# Patient Record
Sex: FEMALE | Race: WHITE | Employment: OTHER | URBAN - METROPOLITAN AREA
[De-identification: names, ages, dates, MRNs, and addresses within clinical notes are randomized per-mention and may not be internally consistent; named-entity substitution may affect disease eponyms.]

---

## 2022-08-22 ENCOUNTER — HOSPITAL ENCOUNTER (INPATIENT)
Facility: HOSPITAL | Age: 72
LOS: 5 days | Discharge: HOME | DRG: 871 | End: 2022-08-28
Attending: FAMILY MEDICINE | Admitting: FAMILY MEDICINE
Payer: MEDICARE

## 2022-08-22 DIAGNOSIS — R50.9 FEVER, UNSPECIFIED FEVER CAUSE: ICD-10-CM

## 2022-08-22 DIAGNOSIS — R00.1 BRADYCARDIA: ICD-10-CM

## 2022-08-22 DIAGNOSIS — R41.82 ALTERED MENTAL STATUS: Primary | ICD-10-CM

## 2022-08-22 LAB
ALBUMIN SERPL BCP-MCNC: 4 G/DL (ref 3.5–5.2)
ALP SERPL-CCNC: 49 U/L (ref 55–135)
ALT SERPL W/O P-5'-P-CCNC: 14 U/L (ref 10–44)
ANION GAP SERPL CALC-SCNC: 13 MMOL/L (ref 8–16)
AST SERPL-CCNC: 17 U/L (ref 10–40)
BACTERIA #/AREA URNS HPF: ABNORMAL /HPF
BASOPHILS # BLD AUTO: 0.06 K/UL (ref 0–0.2)
BASOPHILS NFR BLD: 0.5 % (ref 0–1.9)
BILIRUB SERPL-MCNC: 0.8 MG/DL (ref 0.1–1)
BILIRUB UR QL STRIP: NEGATIVE
BNP SERPL-MCNC: 18 PG/ML (ref 0–99)
BUN SERPL-MCNC: 13 MG/DL (ref 8–23)
CALCIUM SERPL-MCNC: 9.1 MG/DL (ref 8.7–10.5)
CHLORIDE SERPL-SCNC: 99 MMOL/L (ref 95–110)
CLARITY UR: CLEAR
CO2 SERPL-SCNC: 21 MMOL/L (ref 23–29)
COLOR UR: YELLOW
CREAT SERPL-MCNC: 0.8 MG/DL (ref 0.5–1.4)
CTP QC/QA: YES
DIFFERENTIAL METHOD: ABNORMAL
EOSINOPHIL # BLD AUTO: 0 K/UL (ref 0–0.5)
EOSINOPHIL NFR BLD: 0 % (ref 0–8)
ERYTHROCYTE [DISTWIDTH] IN BLOOD BY AUTOMATED COUNT: 13.7 % (ref 11.5–14.5)
EST. GFR  (NO RACE VARIABLE): >60 ML/MIN/1.73 M^2
GLUCOSE SERPL-MCNC: 114 MG/DL (ref 70–110)
GLUCOSE UR QL STRIP: NEGATIVE
HCT VFR BLD AUTO: 44.8 % (ref 37–48.5)
HGB BLD-MCNC: 14.7 G/DL (ref 12–16)
HGB UR QL STRIP: NEGATIVE
HYALINE CASTS #/AREA URNS LPF: 0 /LPF
IMM GRANULOCYTES # BLD AUTO: 0.05 K/UL (ref 0–0.04)
IMM GRANULOCYTES NFR BLD AUTO: 0.4 % (ref 0–0.5)
INFLUENZA A, MOLECULAR: NEGATIVE
INFLUENZA B, MOLECULAR: NEGATIVE
KETONES UR QL STRIP: ABNORMAL
LACTATE SERPL-SCNC: 1.8 MMOL/L (ref 0.5–2.2)
LEUKOCYTE ESTERASE UR QL STRIP: ABNORMAL
LYMPHOCYTES # BLD AUTO: 1.9 K/UL (ref 1–4.8)
LYMPHOCYTES NFR BLD: 14.8 % (ref 18–48)
MCH RBC QN AUTO: 29.1 PG (ref 27–31)
MCHC RBC AUTO-ENTMCNC: 32.8 G/DL (ref 32–36)
MCV RBC AUTO: 89 FL (ref 82–98)
MICROSCOPIC COMMENT: ABNORMAL
MONOCYTES # BLD AUTO: 0.7 K/UL (ref 0.3–1)
MONOCYTES NFR BLD: 5.2 % (ref 4–15)
NEUTROPHILS # BLD AUTO: 10.3 K/UL (ref 1.8–7.7)
NEUTROPHILS NFR BLD: 79.1 % (ref 38–73)
NITRITE UR QL STRIP: NEGATIVE
NRBC BLD-RTO: 0 /100 WBC
PH UR STRIP: 6 [PH] (ref 5–8)
PLATELET # BLD AUTO: 222 K/UL (ref 150–450)
PMV BLD AUTO: 9.7 FL (ref 9.2–12.9)
POCT GLUCOSE: 96 MG/DL (ref 70–110)
POTASSIUM SERPL-SCNC: 4.1 MMOL/L (ref 3.5–5.1)
PROCALCITONIN SERPL IA-MCNC: 0.04 NG/ML
PROT SERPL-MCNC: 7.3 G/DL (ref 6–8.4)
PROT UR QL STRIP: ABNORMAL
RBC # BLD AUTO: 5.06 M/UL (ref 4–5.4)
RBC #/AREA URNS HPF: 0 /HPF (ref 0–4)
SARS-COV-2 RDRP RESP QL NAA+PROBE: NEGATIVE
SODIUM SERPL-SCNC: 133 MMOL/L (ref 136–145)
SP GR UR STRIP: 1.02 (ref 1–1.03)
SPECIMEN SOURCE: NORMAL
SQUAMOUS #/AREA URNS HPF: 1 /HPF
TROPONIN I SERPL DL<=0.01 NG/ML-MCNC: <0.006 NG/ML (ref 0–0.03)
URN SPEC COLLECT METH UR: ABNORMAL
UROBILINOGEN UR STRIP-ACNC: NEGATIVE EU/DL
WBC # BLD AUTO: 13 K/UL (ref 3.9–12.7)
WBC #/AREA URNS HPF: 6 /HPF (ref 0–5)

## 2022-08-22 PROCEDURE — 25000003 PHARM REV CODE 250: Performed by: FAMILY MEDICINE

## 2022-08-22 PROCEDURE — 93010 EKG 12-LEAD: ICD-10-PCS | Mod: ,,, | Performed by: INTERNAL MEDICINE

## 2022-08-22 PROCEDURE — 84145 PROCALCITONIN (PCT): CPT | Performed by: FAMILY MEDICINE

## 2022-08-22 PROCEDURE — 99291 CRITICAL CARE FIRST HOUR: CPT | Mod: 25

## 2022-08-22 PROCEDURE — 96361 HYDRATE IV INFUSION ADD-ON: CPT

## 2022-08-22 PROCEDURE — 96360 HYDRATION IV INFUSION INIT: CPT

## 2022-08-22 PROCEDURE — 83605 ASSAY OF LACTIC ACID: CPT | Performed by: FAMILY MEDICINE

## 2022-08-22 PROCEDURE — 84484 ASSAY OF TROPONIN QUANT: CPT | Performed by: FAMILY MEDICINE

## 2022-08-22 PROCEDURE — 85025 COMPLETE CBC W/AUTO DIFF WBC: CPT | Performed by: FAMILY MEDICINE

## 2022-08-22 PROCEDURE — 80053 COMPREHEN METABOLIC PANEL: CPT | Performed by: FAMILY MEDICINE

## 2022-08-22 PROCEDURE — 82962 GLUCOSE BLOOD TEST: CPT

## 2022-08-22 PROCEDURE — 87502 INFLUENZA DNA AMP PROBE: CPT | Performed by: FAMILY MEDICINE

## 2022-08-22 PROCEDURE — 83880 ASSAY OF NATRIURETIC PEPTIDE: CPT | Performed by: FAMILY MEDICINE

## 2022-08-22 PROCEDURE — 63600175 PHARM REV CODE 636 W HCPCS: Performed by: FAMILY MEDICINE

## 2022-08-22 PROCEDURE — U0002 COVID-19 LAB TEST NON-CDC: HCPCS | Performed by: FAMILY MEDICINE

## 2022-08-22 PROCEDURE — 93010 ELECTROCARDIOGRAM REPORT: CPT | Mod: ,,, | Performed by: INTERNAL MEDICINE

## 2022-08-22 PROCEDURE — 87040 BLOOD CULTURE FOR BACTERIA: CPT | Performed by: FAMILY MEDICINE

## 2022-08-22 PROCEDURE — 81000 URINALYSIS NONAUTO W/SCOPE: CPT | Performed by: FAMILY MEDICINE

## 2022-08-22 PROCEDURE — 62270 DX LMBR SPI PNXR: CPT

## 2022-08-22 PROCEDURE — 93005 ELECTROCARDIOGRAM TRACING: CPT

## 2022-08-22 RX ORDER — LIDOCAINE HYDROCHLORIDE 20 MG/ML
INJECTION, SOLUTION INFILTRATION; PERINEURAL
Status: COMPLETED
Start: 2022-08-22 | End: 2022-08-23

## 2022-08-22 RX ORDER — ACETAMINOPHEN 500 MG
1000 TABLET ORAL
Status: COMPLETED | OUTPATIENT
Start: 2022-08-22 | End: 2022-08-22

## 2022-08-22 RX ADMIN — SODIUM CHLORIDE, POTASSIUM CHLORIDE, SODIUM LACTATE AND CALCIUM CHLORIDE 2427 ML: 600; 310; 30; 20 INJECTION, SOLUTION INTRAVENOUS at 09:08

## 2022-08-22 RX ADMIN — ACETAMINOPHEN 1000 MG: 500 TABLET ORAL at 09:08

## 2022-08-23 PROBLEM — R50.9 FEVER: Status: ACTIVE | Noted: 2022-08-23

## 2022-08-23 PROBLEM — A41.9 SEPSIS: Status: ACTIVE | Noted: 2022-08-23

## 2022-08-23 PROBLEM — G03.9 MENINGITIS: Status: ACTIVE | Noted: 2022-08-23

## 2022-08-23 PROBLEM — R41.82 ALTERED MENTAL STATUS: Status: ACTIVE | Noted: 2022-08-23

## 2022-08-23 LAB
CLARITY CSF: CLEAR
COLOR CSF: COLORLESS
GLUCOSE CSF-MCNC: 65 MG/DL (ref 40–70)
LACTATE SERPL-SCNC: 1.3 MMOL/L (ref 0.5–2.2)
LYMPHOCYTES NFR CSF MANUAL: 6 % (ref 40–80)
MONOS+MACROS NFR CSF MANUAL: 7 % (ref 15–45)
NEUTROPHILS NFR CSF MANUAL: 87 % (ref 0–6)
PATH INTERP FLD-IMP: NORMAL
PROT CSF-MCNC: 53 MG/DL (ref 15–40)
RBC # CSF: 0 /CU MM
SPECIMEN VOL CSF: 1 ML
WBC # CSF: 50 /CU MM (ref 0–5)

## 2022-08-23 PROCEDURE — 63600175 PHARM REV CODE 636 W HCPCS: Performed by: FAMILY MEDICINE

## 2022-08-23 PROCEDURE — 25000003 PHARM REV CODE 250: Performed by: FAMILY MEDICINE

## 2022-08-23 PROCEDURE — 25000003 PHARM REV CODE 250: Performed by: EMERGENCY MEDICINE

## 2022-08-23 PROCEDURE — 82945 GLUCOSE OTHER FLUID: CPT | Performed by: FAMILY MEDICINE

## 2022-08-23 PROCEDURE — 25000003 PHARM REV CODE 250

## 2022-08-23 PROCEDURE — 86788 WEST NILE VIRUS AB IGM: CPT | Performed by: FAMILY MEDICINE

## 2022-08-23 PROCEDURE — 21400001 HC TELEMETRY ROOM

## 2022-08-23 PROCEDURE — 87798 DETECT AGENT NOS DNA AMP: CPT | Performed by: FAMILY MEDICINE

## 2022-08-23 PROCEDURE — 36415 COLL VENOUS BLD VENIPUNCTURE: CPT | Performed by: FAMILY MEDICINE

## 2022-08-23 PROCEDURE — 83605 ASSAY OF LACTIC ACID: CPT | Performed by: FAMILY MEDICINE

## 2022-08-23 PROCEDURE — 87798 DETECT AGENT NOS DNA AMP: CPT | Mod: 59 | Performed by: FAMILY MEDICINE

## 2022-08-23 PROCEDURE — 84157 ASSAY OF PROTEIN OTHER: CPT | Performed by: FAMILY MEDICINE

## 2022-08-23 PROCEDURE — A9698 NON-RAD CONTRAST MATERIALNOC: HCPCS | Performed by: EMERGENCY MEDICINE

## 2022-08-23 PROCEDURE — 89051 BODY FLUID CELL COUNT: CPT | Performed by: FAMILY MEDICINE

## 2022-08-23 PROCEDURE — 25500020 PHARM REV CODE 255: Performed by: EMERGENCY MEDICINE

## 2022-08-23 PROCEDURE — 87529 HSV DNA AMP PROBE: CPT | Performed by: FAMILY MEDICINE

## 2022-08-23 PROCEDURE — 99000 SPECIMEN HANDLING OFFICE-LAB: CPT | Performed by: FAMILY MEDICINE

## 2022-08-23 RX ORDER — SODIUM CHLORIDE 9 MG/ML
INJECTION, SOLUTION INTRAVENOUS
Status: DISCONTINUED | OUTPATIENT
Start: 2022-08-23 | End: 2022-08-28 | Stop reason: HOSPADM

## 2022-08-23 RX ORDER — DEXAMETHASONE SODIUM PHOSPHATE 4 MG/ML
10 INJECTION, SOLUTION INTRA-ARTICULAR; INTRALESIONAL; INTRAMUSCULAR; INTRAVENOUS; SOFT TISSUE EVERY 6 HOURS
Status: DISCONTINUED | OUTPATIENT
Start: 2022-08-23 | End: 2022-08-27

## 2022-08-23 RX ORDER — IBUPROFEN 600 MG/1
600 TABLET ORAL EVERY 6 HOURS PRN
Status: DISCONTINUED | OUTPATIENT
Start: 2022-08-23 | End: 2022-08-28 | Stop reason: HOSPADM

## 2022-08-23 RX ORDER — ROSUVASTATIN CALCIUM 10 MG/1
10 TABLET, COATED ORAL NIGHTLY
COMMUNITY

## 2022-08-23 RX ORDER — ACETAMINOPHEN 325 MG/1
650 TABLET ORAL EVERY 6 HOURS PRN
Status: DISCONTINUED | OUTPATIENT
Start: 2022-08-23 | End: 2022-08-28 | Stop reason: HOSPADM

## 2022-08-23 RX ORDER — ONDANSETRON 2 MG/ML
4 INJECTION INTRAMUSCULAR; INTRAVENOUS EVERY 6 HOURS PRN
Status: DISCONTINUED | OUTPATIENT
Start: 2022-08-23 | End: 2022-08-28 | Stop reason: HOSPADM

## 2022-08-23 RX ADMIN — CEFTRIAXONE 2 G: 2 INJECTION, SOLUTION INTRAVENOUS at 02:08

## 2022-08-23 RX ADMIN — IOHEXOL 100 ML: 350 INJECTION, SOLUTION INTRAVENOUS at 07:08

## 2022-08-23 RX ADMIN — LIDOCAINE HYDROCHLORIDE: 20 INJECTION, SOLUTION INFILTRATION; PERINEURAL at 12:08

## 2022-08-23 RX ADMIN — IOHEXOL 1000 ML: 9 SOLUTION ORAL at 07:08

## 2022-08-23 RX ADMIN — DEXAMETHASONE SODIUM PHOSPHATE 10 MG: 4 INJECTION INTRA-ARTICULAR; INTRALESIONAL; INTRAMUSCULAR; INTRAVENOUS; SOFT TISSUE at 01:08

## 2022-08-23 RX ADMIN — DEXAMETHASONE SODIUM PHOSPHATE 10 MG: 4 INJECTION INTRA-ARTICULAR; INTRALESIONAL; INTRAMUSCULAR; INTRAVENOUS; SOFT TISSUE at 05:08

## 2022-08-23 RX ADMIN — AMPICILLIN 2 G: 2 INJECTION, POWDER, FOR SOLUTION INTRAMUSCULAR; INTRAVENOUS at 04:08

## 2022-08-23 RX ADMIN — SODIUM CHLORIDE: 0.9 INJECTION, SOLUTION INTRAVENOUS at 02:08

## 2022-08-23 RX ADMIN — DEXAMETHASONE SODIUM PHOSPHATE 10 MG: 4 INJECTION INTRA-ARTICULAR; INTRALESIONAL; INTRAMUSCULAR; INTRAVENOUS; SOFT TISSUE at 11:08

## 2022-08-23 RX ADMIN — ACETAMINOPHEN 650 MG: 325 TABLET ORAL at 03:08

## 2022-08-23 RX ADMIN — VANCOMYCIN HYDROCHLORIDE 2000 MG: 500 INJECTION, POWDER, LYOPHILIZED, FOR SOLUTION INTRAVENOUS at 05:08

## 2022-08-23 RX ADMIN — AMPICILLIN 2 G: 2 INJECTION, POWDER, FOR SOLUTION INTRAMUSCULAR; INTRAVENOUS at 09:08

## 2022-08-23 RX ADMIN — AMPICILLIN 2 G: 2 INJECTION, POWDER, FOR SOLUTION INTRAMUSCULAR; INTRAVENOUS at 06:08

## 2022-08-23 RX ADMIN — ACETAMINOPHEN 650 MG: 325 TABLET ORAL at 12:08

## 2022-08-23 RX ADMIN — DEXAMETHASONE SODIUM PHOSPHATE 10 MG: 4 INJECTION INTRA-ARTICULAR; INTRALESIONAL; INTRAMUSCULAR; INTRAVENOUS; SOFT TISSUE at 06:08

## 2022-08-23 NOTE — SIGNIFICANT EVENT
71 y/o female admitted for meningitis and started on IV ampicillin, rocephin, and vancomycin. Continues to spike high fevers. Monitor fever trend. Hsv, [place] [facility] studies pending. Will discuss with infectious disease.

## 2022-08-23 NOTE — ASSESSMENT & PLAN NOTE
Sepsis due to meningitis per csf  Elevated WBC and protein  Although normal csf glucose   Indicative of viral etiology  Will cover empirically for bacterial meningitis   While cultures are pending  Vanc, rocephin, and ampicillin  Hsv, [place] [facility] studies   CT reviewed  Blood cultures pending

## 2022-08-23 NOTE — ED PROVIDER NOTES
SCRIBE #1 NOTE: I, Krzysztof Taylor, am scribing for, and in the presence of, Angela Carlson MD. I have scribed the entire note.       History     Chief Complaint   Patient presents with    Gait Problem     Pt reports trouble with balance since yesterday     Review of patient's allergies indicates:  No Known Allergies      History of Present Illness     HPI    8/22/2022, 11:10 PM  History obtained from the patient      History of Present Illness: Fang Quiles is a 72 y.o. female patient who presents to the Emergency Department for evaluation of gait problems and fever x 1 day. Son reports that his mom is not acting herself, seems confused and having trouble with balance and walking.  He thought her speech was slurred today.  Last known well time this am. Patient febrile on arrival, but denies cough, SOB, n/v/d or abdominal pain. Symptoms are constant and moderate in severity. No mitigating or exacerbating factors reported. No additional associated sxs reported. Patient denies any HA, abd pain, CP, n/v, SOB, and all other sxs at this time. No prior tx reported. No further complaints or concerns at this time.       Arrival mode: Personal vehicle     PCP: Primary Doctor No        Past Medical History:  No past medical history on file.    Past Surgical History:  No past surgical history on file.      Family History:  No family history on file.    Social History:  Social History     Tobacco Use    Smoking status: Not on file    Smokeless tobacco: Not on file   Substance and Sexual Activity    Alcohol use: Not on file    Drug use: Not on file    Sexual activity: Not on file        Review of Systems     Review of Systems   Constitutional: Positive for activity change and fatigue. Negative for fever.   HENT: Negative for sore throat.    Respiratory: Negative for shortness of breath.    Cardiovascular: Negative for chest pain.   Gastrointestinal: Negative for nausea.   Genitourinary: Negative for dysuria.    Musculoskeletal: Positive for gait problem. Negative for back pain.   Skin: Negative for rash.   Neurological: Positive for weakness.   Hematological: Does not bruise/bleed easily.   All other systems reviewed and are negative.     Physical Exam     Initial Vitals [08/22/22 2035]   BP Pulse Resp Temp SpO2   134/68 (!) 111 20 (!) 103 °F (39.4 °C) 95 %      MAP       --          Physical Exam  Nursing Notes and Vital Signs Reviewed.  Constitutional: Patient is in no acute distress. Well-developed and well-nourished.  Head: Atraumatic. Normocephalic.  Eyes: PERRL. EOM intact. Conjunctivae are not pale. No scleral icterus.  ENT: Mucous membranes are moist. Oropharynx is clear and symmetric.    Neck: Supple. Full ROM. No lymphadenopathy.  Cardiovascular: Regular rate. Regular rhythm. No murmurs, rubs, or gallops. Distal pulses are 2+ and symmetric.  Pulmonary/Chest: No respiratory distress. Clear to auscultation bilaterally. No wheezing or rales.  Abdominal: Soft and non-distended.  There is no tenderness.  No rebound, guarding, or rigidity. Good bowel sounds.  Genitourinary: No CVA tenderness  Musculoskeletal: Moves all extremities. No obvious deformities. No edema. No calf tenderness.  Skin: Warm and dry.  Neurological:  Alert, awake, and appropriate.  Normal speech.  No acute focal neurological deficits are appreciated.  Psychiatric: Normal affect. Good eye contact. Appropriate in content.     ED Course   Lumbar Puncture    Date/Time: 8/22/2022 11:38 PM  Location procedure was performed: [facility] EMERGENCY DEPARTMENT  Performed by: Angela Carlson MD  Authorized by: Angela Carlson MD   Consent Done: Yes  Indications: evaluation for infection and evaluation for altered mental status    Anesthesia:  Local Anesthetic: lidocaine 1% without epinephrine  Anesthetic total: 10 mL    Patient sedated: no  Preparation: Patient was prepped and draped in the usual sterile fashion.  Lumbar space: L4-L5 interspace  Patient's  position: sitting  Needle gauge: 18  Number of attempts: 1  Fluid appearance: clear  Post-procedure: site cleaned, pressure dressing applied and adhesive bandage applied  Complications: No  Specimens: No  Implants: No  Patient tolerance: Patient tolerated the procedure well with no immediate complications    Critical Care    Date/Time: 8/23/2022 12:46 AM  Performed by: Angela Carlson MD  Authorized by: Angela Carlson MD   Direct patient critical care time: 15 minutes  Additional history critical care time: 5 minutes  Ordering / reviewing critical care time: 10 minutes  Documentation critical care time: 5 minutes  Consulting other physicians critical care time: 5 minutes  Consult with family critical care time: 5 minutes  Total critical care time (exclusive of procedural time) : 45 minutes  Critical care time was exclusive of separately billable procedures and treating other patients and teaching time.  Critical care was necessary to treat or prevent imminent or life-threatening deterioration of the following conditions: Altered mental status.  Critical care was time spent personally by me on the following activities: blood draw for specimens, development of treatment plan with patient or surrogate, discussions with consultants, interpretation of cardiac output measurements, evaluation of patient's response to treatment, examination of patient, ordering and performing treatments and interventions, obtaining history from patient or surrogate, ordering and review of laboratory studies, pulse oximetry, review of old charts, re-evaluation of patient's condition and ordering and review of radiographic studies.        ED Vital Signs:  Vitals:    08/22/22 2035 08/22/22 2102 08/22/22 2109 08/22/22 2252   BP: 134/68   135/64   Pulse: (!) 111  108 89   Resp: 20   18   Temp: (!) 103 °F (39.4 °C) (!) 103 °F (39.4 °C)  99.5 °F (37.5 °C)   TempSrc: Oral   Oral   SpO2: 95%   95%   Weight: 80.8 kg (178 lb 3.9 oz)     "  Height: 5' 8" (1.727 m)          Abnormal Lab Results:  Labs Reviewed   CBC W/ AUTO DIFFERENTIAL - Abnormal; Notable for the following components:       Result Value    WBC 13.00 (*)     Gran # (ANC) 10.3 (*)     Immature Grans (Abs) 0.05 (*)     Gran % 79.1 (*)     Lymph % 14.8 (*)     All other components within normal limits   COMPREHENSIVE METABOLIC PANEL - Abnormal; Notable for the following components:    Sodium 133 (*)     CO2 21 (*)     Glucose 114 (*)     Alkaline Phosphatase 49 (*)     All other components within normal limits   INFLUENZA A & B BY MOLECULAR   CULTURE, BLOOD   CULTURE, BLOOD   LACTIC ACID, PLASMA   B-TYPE NATRIURETIC PEPTIDE   TROPONIN I   PROCALCITONIN   URINALYSIS, REFLEX TO URINE CULTURE   LACTIC ACID, PLASMA   URINALYSIS MICROSCOPIC   SARS-COV-2 RDRP GENE    Narrative:     This test utilizes isothermal nucleic acid amplification   technology to detect the SARS-CoV-2 RdRp nucleic acid segment.   The analytical sensitivity (limit of detection) is 125 genome   equivalents/mL.   A POSITIVE result implies infection with the SARS-CoV-2 virus;   the patient is presumed to be contagious.     A NEGATIVE result means that SARS-CoV-2 nucleic acids are not   present above the limit of detection. A NEGATIVE result should be   treated as presumptive. It does not rule out the possibility of   COVID-19 and should not be the sole basis for treatment decisions.   If COVID-19 is strongly suspected based on clinical and exposure   history, re-testing using an alternate molecular assay should be   considered.   This test is only for use under the Food and Drug   Administration s Emergency Use Authorization (EUA).   Commercial kits are provided by [employer].   Performance characteristics of the EUA have been independently   verified by [facility] Department of   Pathology and Laboratory Medicine.   _________________________________________________________________   The authorized Fact " Sheet for Healthcare Providers and the authorized Fact   Sheet for Patients of the ID NOW COVID-19 are available on the FDA   website:     https://www.fda.gov/media/795875/download  https://www.fda.gov/media/832307/download           POCT GLUCOSE        All Lab Results:  Results for orders placed or performed during the hospital encounter of 08/22/22   Influenza A & B by Molecular    Specimen: Nasopharyngeal Swab   Result Value Ref Range    Influenza A, Molecular Negative Negative    Influenza B, Molecular Negative Negative    Flu A & B Source NP    CBC auto differential   Result Value Ref Range    WBC 13.00 (H) 3.90 - 12.70 K/uL    RBC 5.06 4.00 - 5.40 M/uL    Hemoglobin 14.7 12.0 - 16.0 g/dL    Hematocrit 44.8 37.0 - 48.5 %    MCV 89 82 - 98 fL    MCH 29.1 27.0 - 31.0 pg    MCHC 32.8 32.0 - 36.0 g/dL    RDW 13.7 11.5 - 14.5 %    Platelets 222 150 - 450 K/uL    MPV 9.7 9.2 - 12.9 fL    Immature Granulocytes 0.4 0.0 - 0.5 %    Gran # (ANC) 10.3 (H) 1.8 - 7.7 K/uL    Immature Grans (Abs) 0.05 (H) 0.00 - 0.04 K/uL    Lymph # 1.9 1.0 - 4.8 K/uL    Mono # 0.7 0.3 - 1.0 K/uL    Eos # 0.0 0.0 - 0.5 K/uL    Baso # 0.06 0.00 - 0.20 K/uL    nRBC 0 0 /100 WBC    Gran % 79.1 (H) 38.0 - 73.0 %    Lymph % 14.8 (L) 18.0 - 48.0 %    Mono % 5.2 4.0 - 15.0 %    Eosinophil % 0.0 0.0 - 8.0 %    Basophil % 0.5 0.0 - 1.9 %    Differential Method Automated    Comprehensive metabolic panel   Result Value Ref Range    Sodium 133 (L) 136 - 145 mmol/L    Potassium 4.1 3.5 - 5.1 mmol/L    Chloride 99 95 - 110 mmol/L    CO2 21 (L) 23 - 29 mmol/L    Glucose 114 (H) 70 - 110 mg/dL    BUN 13 8 - 23 mg/dL    Creatinine 0.8 0.5 - 1.4 mg/dL    Calcium 9.1 8.7 - 10.5 mg/dL    Total Protein 7.3 6.0 - 8.4 g/dL    Albumin 4.0 3.5 - 5.2 g/dL    Total Bilirubin 0.8 0.1 - 1.0 mg/dL    Alkaline Phosphatase 49 (L) 55 - 135 U/L    AST 17 10 - 40 U/L    ALT 14 10 - 44 U/L    Anion Gap 13 8 - 16 mmol/L    eGFR >60 >60 mL/min/1.73 m^2   Lactic acid, plasma #1    Result Value Ref Range    Lactate (Lactic Acid) 1.8 0.5 - 2.2 mmol/L   Urinalysis, Reflex to Urine Culture Urine, Clean Catch    Specimen: Urine   Result Value Ref Range    Specimen UA Urine, Clean Catch     Color, UA Yellow Yellow, Straw, Isis    Appearance, UA Clear Clear    pH, UA 6.0 5.0 - 8.0    Specific Gravity, UA 1.020 1.005 - 1.030    Protein, UA 1+ (A) Negative    Glucose, UA Negative Negative    Ketones, UA 3+ (A) Negative    Bilirubin (UA) Negative Negative    Occult Blood UA Negative Negative    Nitrite, UA Negative Negative    Urobilinogen, UA Negative <2.0 EU/dL    Leukocytes, UA Trace (A) Negative   Brain natriuretic peptide   Result Value Ref Range    BNP 18 0 - 99 pg/mL   Troponin I   Result Value Ref Range    Troponin I <0.006 0.000 - 0.026 ng/mL   Procalcitonin   Result Value Ref Range    Procalcitonin 0.04 <0.25 ng/mL   Urinalysis Microscopic   Result Value Ref Range    RBC, UA 0 0 - 4 /hpf    WBC, UA 6 (H) 0 - 5 /hpf    Bacteria None None-Occ /hpf    Squam Epithel, UA 1 /hpf    Hyaline Casts, UA 0 0-1/lpf /lpf    Microscopic Comment SEE COMMENT    POCT COVID-19 Rapid Screening   Result Value Ref Range    POC Rapid COVID Negative Negative     Acceptable Yes    POCT glucose   Result Value Ref Range    POCT Glucose 96 70 - 110 mg/dL       Imaging Results:  Imaging Results          CT Head Without Contrast (Final result)  Result time 08/22/22 22:07:40    Final result by Oliverio Zabala MD (08/22/22 22:07:40)                 Impression:      No acute intracranial CT abnormality.  Clinical correlation and further evaluation as warranted.    All CT scans at this facility are performed  using dose modulation techniques as appropriate to performed exam including the following:  automated exposure control; adjustment of mA and/or kV according to the patients size (this includes techniques or standardized protocols for targeted exams where dose is matched to indication/reason for exam:  i.e. extremities or head);  iterative reconstruction technique.      Electronically signed by: Oliverio Zabala  Date:    08/22/2022  Time:    22:07             Narrative:    EXAMINATION:  CT HEAD WITHOUT CONTRAST    CLINICAL HISTORY:  Dizziness;    TECHNIQUE:  Low dose axial CT images obtained throughout the head without intravenous contrast. Sagittal and coronal reconstructions were performed.    COMPARISON:  None.    FINDINGS:  Intracranial compartment:    Ventricles and sulci are normal in size for age without evidence of hydrocephalus. No extra-axial blood or fluid collections.    The brain parenchyma appears normal. No parenchymal mass, hemorrhage, edema or major vascular distribution infarct.    Skull/extracranial contents (limited evaluation): No fracture. Mastoid air cells and paranasal sinuses are essentially clear.                               X-Ray Chest AP Portable (Final result)  Result time 08/22/22 21:42:34    Final result by Oliverio Zabala MD (08/22/22 21:42:34)                 Impression:      No acute abnormality.      Electronically signed by: Oliverio Zabala  Date:    08/22/2022  Time:    21:42             Narrative:    EXAMINATION:  XR CHEST AP PORTABLE    CLINICAL HISTORY:  Sepsis;    TECHNIQUE:  Single frontal view of the chest was performed.    COMPARISON:  05/25/2011    FINDINGS:  Left clavicular surgical fixation.    The lungs are clear, with normal appearance of pulmonary vasculature and no pleural effusion or pneumothorax.    The cardiac silhouette is normal in size. The hilar and mediastinal contours are unremarkable.    Degenerative change of the shoulders.                                 The EKG was ordered, reviewed, and independently interpreted by the ED provider.  Interpretation time: 20:49  Rate: 107 BPM  Rhythm: sinus tachycardia  Interpretation: No acute ST changes. No STEMI.         The Emergency Provider reviewed the vital signs and test results, which are outlined above.      ED Discussion     12:45 AM: Discussed case with Joanne Conroy NP ([facility]). Dr. Rodriguez agrees with current care and management of pt and accepts admission.   Admitting Service: Hospital medicine  Admitting Physician: Dr. Rodriguez  Admit to: OBS med surg    12:45 AM: Re-evaluated pt. I have discussed test results, shared treatment plan, and the need for admission with patient and family at bedside. Pt and family express understanding at this time and agree with all information. All questions answered. Pt and family have no further questions or concerns at this time. Pt is ready for admit.       Medical Decision Making:   Clinical Tests:   Lab Tests: Reviewed and Ordered  Radiological Study: Reviewed and Ordered  Medical Tests: Ordered and Reviewed           ED Medication(s):  Medications   lactated ringers bolus 2,427 mL (0 mL/kg × 80.9 kg Intravenous Stopped 8/22/22 2252)   acetaminophen tablet 1,000 mg (1,000 mg Oral Given 8/22/22 2102)       New Prescriptions    No medications on file               Scribe Attestation:   Scribe #1: I performed the above scribed service and the documentation accurately describes the services I performed. I attest to the accuracy of the note.     Attending:   Physician Attestation Statement for Scribe #1: I, Angela Carlson MD, personally performed the services described in this documentation, as scribed by Krzysztof Taylor, in my presence, and it is both accurate and complete.           Clinical Impression       ICD-10-CM ICD-9-CM   1. Altered mental status  R41.82 780.97       Disposition:   Disposition: Placed in Observation  Condition: Fair         Angela Carlson MD  08/24/22 8509

## 2022-08-23 NOTE — PLAN OF CARE
Care plan reviewed with patient. Fever managed by PR[facility] med. Able to ge tup to the bathroom unassisted. Afebrile as of this time. Free from falls. No other complaints made.

## 2022-08-23 NOTE — H&P
[facility] Medicine  History & Physical    Patient Name: Fang Quiles  MRN: [id number]  Patient Class: OP- Observation  Admission Date: 8/22/2022  Attending Physician: Viral Rodriguez MD  Primary Care Provider: Primary Doctor No         Patient information was obtained from relative(s) and ER records.     Subjective:     Principal Problem:Meningitis    Chief Complaint:   Chief Complaint   Patient presents with    Gait Problem     Pt reports trouble with balance since yesterday        HPI: Patient is a 72 y.o.  female with no reported PMH who presents to the Emergency Department for evaluation of gait problems and fever x 1 day. Patient altered due to condition therefore hx obtained via family at bedside. Per report, son noticed that patient was not acting her usual self. She was having difficulty with balance and walking along with slurred speech. Symptoms all started earlier this am. No reported symptoms of cough, sob, n/v/d, or abdominal pain. Family does report chronic back pain. Patient complains of headache, blurred vision, and sensitivity to light.     In the ED, tmax 103. Chest xray and u/a unremarkable. WBC: 13k. Head CT unremarkable. LP performed by ED physician. HM consulted and patient placed in observation. Patient started empirically on vanc, rocephin, ampicillin, and decadron.           No past medical history on file.    No past surgical history on file.    Review of patient's allergies indicates:  No Known Allergies    No current facility-administered medications on file prior to encounter.     No current outpatient medications on file prior to encounter.     Family History    None       Tobacco Use    Smoking status: Not on file    Smokeless tobacco: Not on file   Substance and Sexual Activity    Alcohol use: Not on file    Drug use: Not on file    Sexual activity: Not on file     Review of Systems   Unable to perform ROS: Acuity of condition   Objective:      Vital Signs (Most Recent):  Temp: 100.3 °F (37.9 °C) (08/23/22 0205)  Pulse: 83 (08/23/22 0205)  Resp: 18 (08/23/22 0205)  BP: 133/83 (08/23/22 0205)  SpO2: (!) 93 % (08/23/22 0205)   Vital Signs (24h Range):  Temp:  [99 °F (37.2 °C)-103 °F (39.4 °C)] 100.3 °F (37.9 °C)  Pulse:  [] 83  Resp:  [18-20] 18  SpO2:  [93 %-95 %] 93 %  BP: (133-135)/(64-83) 133/83     Weight: 80.8 kg (178 lb 3.9 oz)  Body mass index is 27.1 kg/m².    Physical Exam  Constitutional:       General: She is not in acute distress.     Appearance: She is well-developed. She is not diaphoretic.   HENT:      Head: Normocephalic and atraumatic.   Eyes:      Pupils: Pupils are equal, round, and reactive to light.   Cardiovascular:      Rate and Rhythm: Normal rate and regular rhythm.      Heart sounds: Normal heart sounds. No murmur heard.    No friction rub. No gallop.   Pulmonary:      Effort: Pulmonary effort is normal. No respiratory distress.      Breath sounds: Normal breath sounds. No stridor. No wheezing or rales.   Abdominal:      General: Bowel sounds are normal. There is no distension.      Palpations: Abdomen is soft. There is no mass.      Tenderness: There is no abdominal tenderness. There is no guarding.   Musculoskeletal:      Right lower leg: No edema.      Left lower leg: No edema.   Skin:     General: Skin is warm.      Findings: No erythema.   Neurological:      Mental Status: She is disoriented.      Motor: No weakness.      Comments: Follows commands         CRANIAL NERVES     CN III, IV, VI   Pupils are equal, round, and reactive to light.     Significant Labs:   Results for orders placed or performed during the hospital encounter of 08/22/22   Influenza A & B by Molecular    Specimen: Nasopharyngeal Swab   Result Value Ref Range    Influenza A, Molecular Negative Negative    Influenza B, Molecular Negative Negative    Flu A & B Source NP    CBC auto differential   Result Value Ref Range    WBC 13.00 (H) 3.90 - 12.70  K/uL    RBC 5.06 4.00 - 5.40 M/uL    Hemoglobin 14.7 12.0 - 16.0 g/dL    Hematocrit 44.8 37.0 - 48.5 %    MCV 89 82 - 98 fL    MCH 29.1 27.0 - 31.0 pg    MCHC 32.8 32.0 - 36.0 g/dL    RDW 13.7 11.5 - 14.5 %    Platelets 222 150 - 450 K/uL    MPV 9.7 9.2 - 12.9 fL    Immature Granulocytes 0.4 0.0 - 0.5 %    Gran # (ANC) 10.3 (H) 1.8 - 7.7 K/uL    Immature Grans (Abs) 0.05 (H) 0.00 - 0.04 K/uL    Lymph # 1.9 1.0 - 4.8 K/uL    Mono # 0.7 0.3 - 1.0 K/uL    Eos # 0.0 0.0 - 0.5 K/uL    Baso # 0.06 0.00 - 0.20 K/uL    nRBC 0 0 /100 WBC    Gran % 79.1 (H) 38.0 - 73.0 %    Lymph % 14.8 (L) 18.0 - 48.0 %    Mono % 5.2 4.0 - 15.0 %    Eosinophil % 0.0 0.0 - 8.0 %    Basophil % 0.5 0.0 - 1.9 %    Differential Method Automated    Comprehensive metabolic panel   Result Value Ref Range    Sodium 133 (L) 136 - 145 mmol/L    Potassium 4.1 3.5 - 5.1 mmol/L    Chloride 99 95 - 110 mmol/L    CO2 21 (L) 23 - 29 mmol/L    Glucose 114 (H) 70 - 110 mg/dL    BUN 13 8 - 23 mg/dL    Creatinine 0.8 0.5 - 1.4 mg/dL    Calcium 9.1 8.7 - 10.5 mg/dL    Total Protein 7.3 6.0 - 8.4 g/dL    Albumin 4.0 3.5 - 5.2 g/dL    Total Bilirubin 0.8 0.1 - 1.0 mg/dL    Alkaline Phosphatase 49 (L) 55 - 135 U/L    AST 17 10 - 40 U/L    ALT 14 10 - 44 U/L    Anion Gap 13 8 - 16 mmol/L    eGFR >60 >60 mL/min/1.73 m^2   Lactic acid, plasma #1   Result Value Ref Range    Lactate (Lactic Acid) 1.8 0.5 - 2.2 mmol/L   Urinalysis, Reflex to Urine Culture Urine, Clean Catch    Specimen: Urine   Result Value Ref Range    Specimen UA Urine, Clean Catch     Color, UA Yellow Yellow, Straw, Isis    Appearance, UA Clear Clear    pH, UA 6.0 5.0 - 8.0    Specific Gravity, UA 1.020 1.005 - 1.030    Protein, UA 1+ (A) Negative    Glucose, UA Negative Negative    Ketones, UA 3+ (A) Negative    Bilirubin (UA) Negative Negative    Occult Blood UA Negative Negative    Nitrite, UA Negative Negative    Urobilinogen, UA Negative <2.0 EU/dL    Leukocytes, UA Trace (A) Negative   Brain  natriuretic peptide   Result Value Ref Range    BNP 18 0 - 99 pg/mL   Troponin I   Result Value Ref Range    Troponin I <0.006 0.000 - 0.026 ng/mL   Procalcitonin   Result Value Ref Range    Procalcitonin 0.04 <0.25 ng/mL   Lactic acid, plasma #2   Result Value Ref Range    Lactate (Lactic Acid) 1.3 0.5 - 2.2 mmol/L   Urinalysis Microscopic   Result Value Ref Range    RBC, UA 0 0 - 4 /hpf    WBC, UA 6 (H) 0 - 5 /hpf    Bacteria None None-Occ /hpf    Squam Epithel, UA 1 /hpf    Hyaline Casts, UA 0 0-1/lpf /lpf    Microscopic Comment SEE COMMENT    Glucose, CSF (Tube 1)   Result Value Ref Range    Glucose, CSF 65 40 - 70 mg/dL   Protein, CSF (Tube 1)   Result Value Ref Range    Protein, CSF 53 (H) 15 - 40 mg/dL   CSF cell count with differential (Tube 4)   Result Value Ref Range    Heme Aliquot 1.0 mL    Appearance, CSF Clear Clear    Color, CSF Colorless Colorless    WBC, CSF 50 (H) 0 - 5 /cu mm    RBC, CSF 0 0 /cu mm    Segmented Neutrophils, CSF 87 (H) 0 - 6 %    Lymphs, CSF 6 (L) 40 - 80 %    Mono/Macrophage, CSF 7 (L) 15 - 45 %   POCT COVID-19 Rapid Screening   Result Value Ref Range    POC Rapid COVID Negative Negative     Acceptable Yes    POCT glucose   Result Value Ref Range    POCT Glucose 96 70 - 110 mg/dL        Significant Imaging: CT Head Without Contrast  Narrative: EXAMINATION:  CT HEAD WITHOUT CONTRAST    CLINICAL HISTORY:  Dizziness;    TECHNIQUE:  Low dose axial CT images obtained throughout the head without intravenous contrast. Sagittal and coronal reconstructions were performed.    COMPARISON:  None.    FINDINGS:  Intracranial compartment:    Ventricles and sulci are normal in size for age without evidence of hydrocephalus. No extra-axial blood or fluid collections.    The brain parenchyma appears normal. No parenchymal mass, hemorrhage, edema or major vascular distribution infarct.    Skull/extracranial contents (limited evaluation): No fracture. Mastoid air cells and paranasal  sinuses are essentially clear.  Impression: No acute intracranial CT abnormality.  Clinical correlation and further evaluation as warranted.    All CT scans at this facility are performed  using dose modulation techniques as appropriate to performed exam including the following:  automated exposure control; adjustment of mA and/or kV according to the patients size (this includes techniques or standardized protocols for targeted exams where dose is matched to indication/reason for exam: i.e. extremities or head);  iterative reconstruction technique.    Electronically signed by: Oliverio Zabala  Date:    08/22/2022  Time:    22:07  X-Ray Chest AP Portable  Narrative: EXAMINATION:  XR CHEST AP PORTABLE    CLINICAL HISTORY:  Sepsis;    TECHNIQUE:  Single frontal view of the chest was performed.    COMPARISON:  05/25/2011    FINDINGS:  Left clavicular surgical fixation.    The lungs are clear, with normal appearance of pulmonary vasculature and no pleural effusion or pneumothorax.    The cardiac silhouette is normal in size. The hilar and mediastinal contours are unremarkable.    Degenerative change of the shoulders.  Impression: No acute abnormality.    Electronically signed by: Oliverio Zabala  Date:    08/22/2022  Time:    21:42      Assessment/Plan:     * Meningitis  Sepsis due to meningitis per csf  Elevated WBC and protein  Although normal csf glucose   Indicative of viral etiology  Will cover empirically for bacterial meningitis   While cultures are pending  Vanc, rocephin, and ampicillin  Hsv, [place] [facility] studies   CT reviewed  Blood cultures pending      Sepsis  See meningitis       Altered mental status  Secondary to cns infection  Cont to monitor         VTE Risk Mitigation (From admission, onward)         Ordered     Place sequential compression device  Until discontinued         08/23/22 0228                   Viral Rodriguez MD  Department of Hospital Medicine   [facility] ([facility])

## 2022-08-23 NOTE — PLAN OF CARE
[facility] - Telemetry (Hospital)  Initial Discharge Assessment       Primary Care Provider: Primary Doctor No    Admission Diagnosis: Altered mental status [R41.82]  Fever, unspecified fever cause [R50.9]    Admission Date: 8/22/2022  Expected Discharge Date:     Discharge Barriers Identified: None    Payor: MEDICARE / Plan: MEDICARE PART A & B / Product Type: Government /     Extended Emergency Contact Information  Primary Emergency Contact: Micah Quiles  Mobile Phone: [phone]  Relation: Spouse  Preferred language: English   needed? No    Discharge Plan A: Home, Home with family       No Pharmacies Listed    Initial Assessment (most recent)     Adult Discharge Assessment - 08/23/22 1536        Discharge Assessment    Assessment Type Discharge Planning Assessment     Confirmed/corrected address, phone number and insurance Yes     Confirmed Demographics Correct on Facesheet     Source of Information family     Communicated ELIUD with patient/caregiver Date not available/Unable to determine     Reason For Admission Meningitis     Lives With spouse     Facility Arrived From: home     Do you expect to return to your current living situation? Yes     Do you have help at home or someone to help you manage your care at home? Yes     Who are your caregiver(s) and their phone number(s)? Micah Moreland     Prior to hospitilization cognitive status: Alert/Oriented     Current cognitive status: Alert/Oriented     Walking or Climbing Stairs Difficulty none     Dressing/Bathing Difficulty none     Equipment Currently Used at Home none     Readmission within 30 days? No     Patient currently being followed by outpatient case management? No     Do you currently have service(s) that help you manage your care at home? No     Do you take prescription medications? Yes     Do you have prescription coverage? Yes     Do you have any problems affording any of your prescribed medications? No     Is the patient taking  medications as prescribed? yes     Who is going to help you get home at discharge? Pt's spouse     How do you get to doctors appointments? car, drives self;family or friend will provide     Are you on dialysis? No     Do you take coumadin? No     Discharge Plan A Home;Home with family     DME Needed Upon Discharge  none     Discharge Plan discussed with: Patient   relative    Discharge Barriers Identified None               Swer spoke with pt's sister-in-law, Adilia, to complete assessment. Adilia reports patient being previously independent with ADLs with no DME. Patient lives at home with her spouse. Pt's spouse will be her help at home. Discharge needs dependent on hospital progress. Swer to continue following to assist with CM needs.

## 2022-08-23 NOTE — HPI
Patient is a 72 y.o.  female with no reported PMH who presents to the Emergency Department for evaluation of gait problems and fever x 1 day. Patient altered due to condition therefore hx obtained via family at bedside. Per report, son noticed that patient was not acting her usual self. She was having difficulty with balance and walking along with slurred speech. Symptoms all started earlier this am. No reported symptoms of cough, sob, n/v/d, or abdominal pain. Family does report chronic back pain. Patient complains of headache, blurred vision, and sensitivity to light.     In the ED, tmax 103. Chest xray and u/a unremarkable. WBC: 13k. Head CT unremarkable. LP performed by ED physician. HM consulted and patient placed in observation. Patient started empirically on vanc, rocephin, ampicillin, and decadron.

## 2022-08-23 NOTE — SUBJECTIVE & OBJECTIVE
No past medical history on file.    No past surgical history on file.    Review of patient's allergies indicates:  No Known Allergies    No current facility-administered medications on file prior to encounter.     No current outpatient medications on file prior to encounter.     Family History    None       Tobacco Use    Smoking status: Not on file    Smokeless tobacco: Not on file   Substance and Sexual Activity    Alcohol use: Not on file    Drug use: Not on file    Sexual activity: Not on file     Review of Systems   Unable to perform ROS: Acuity of condition   Objective:     Vital Signs (Most Recent):  Temp: 100.3 °F (37.9 °C) (08/23/22 0205)  Pulse: 83 (08/23/22 0205)  Resp: 18 (08/23/22 0205)  BP: 133/83 (08/23/22 0205)  SpO2: (!) 93 % (08/23/22 0205)   Vital Signs (24h Range):  Temp:  [99 °F (37.2 °C)-103 °F (39.4 °C)] 100.3 °F (37.9 °C)  Pulse:  [] 83  Resp:  [18-20] 18  SpO2:  [93 %-95 %] 93 %  BP: (133-135)/(64-83) 133/83     Weight: 80.8 kg (178 lb 3.9 oz)  Body mass index is 27.1 kg/m².    Physical Exam  Constitutional:       General: She is not in acute distress.     Appearance: She is well-developed. She is not diaphoretic.   HENT:      Head: Normocephalic and atraumatic.   Eyes:      Pupils: Pupils are equal, round, and reactive to light.   Cardiovascular:      Rate and Rhythm: Normal rate and regular rhythm.      Heart sounds: Normal heart sounds. No murmur heard.    No friction rub. No gallop.   Pulmonary:      Effort: Pulmonary effort is normal. No respiratory distress.      Breath sounds: Normal breath sounds. No stridor. No wheezing or rales.   Abdominal:      General: Bowel sounds are normal. There is no distension.      Palpations: Abdomen is soft. There is no mass.      Tenderness: There is no abdominal tenderness. There is no guarding.   Musculoskeletal:      Right lower leg: No edema.      Left lower leg: No edema.   Skin:     General: Skin is warm.      Findings: No erythema.    Neurological:      Mental Status: She is disoriented.      Motor: No weakness.      Comments: Follows commands         CRANIAL NERVES     CN III, IV, VI   Pupils are equal, round, and reactive to light.     Significant Labs:   Results for orders placed or performed during the hospital encounter of 08/22/22   Influenza A & B by Molecular    Specimen: Nasopharyngeal Swab   Result Value Ref Range    Influenza A, Molecular Negative Negative    Influenza B, Molecular Negative Negative    Flu A & B Source NP    CBC auto differential   Result Value Ref Range    WBC 13.00 (H) 3.90 - 12.70 K/uL    RBC 5.06 4.00 - 5.40 M/uL    Hemoglobin 14.7 12.0 - 16.0 g/dL    Hematocrit 44.8 37.0 - 48.5 %    MCV 89 82 - 98 fL    MCH 29.1 27.0 - 31.0 pg    MCHC 32.8 32.0 - 36.0 g/dL    RDW 13.7 11.5 - 14.5 %    Platelets 222 150 - 450 K/uL    MPV 9.7 9.2 - 12.9 fL    Immature Granulocytes 0.4 0.0 - 0.5 %    Gran # (ANC) 10.3 (H) 1.8 - 7.7 K/uL    Immature Grans (Abs) 0.05 (H) 0.00 - 0.04 K/uL    Lymph # 1.9 1.0 - 4.8 K/uL    Mono # 0.7 0.3 - 1.0 K/uL    Eos # 0.0 0.0 - 0.5 K/uL    Baso # 0.06 0.00 - 0.20 K/uL    nRBC 0 0 /100 WBC    Gran % 79.1 (H) 38.0 - 73.0 %    Lymph % 14.8 (L) 18.0 - 48.0 %    Mono % 5.2 4.0 - 15.0 %    Eosinophil % 0.0 0.0 - 8.0 %    Basophil % 0.5 0.0 - 1.9 %    Differential Method Automated    Comprehensive metabolic panel   Result Value Ref Range    Sodium 133 (L) 136 - 145 mmol/L    Potassium 4.1 3.5 - 5.1 mmol/L    Chloride 99 95 - 110 mmol/L    CO2 21 (L) 23 - 29 mmol/L    Glucose 114 (H) 70 - 110 mg/dL    BUN 13 8 - 23 mg/dL    Creatinine 0.8 0.5 - 1.4 mg/dL    Calcium 9.1 8.7 - 10.5 mg/dL    Total Protein 7.3 6.0 - 8.4 g/dL    Albumin 4.0 3.5 - 5.2 g/dL    Total Bilirubin 0.8 0.1 - 1.0 mg/dL    Alkaline Phosphatase 49 (L) 55 - 135 U/L    AST 17 10 - 40 U/L    ALT 14 10 - 44 U/L    Anion Gap 13 8 - 16 mmol/L    eGFR >60 >60 mL/min/1.73 m^2   Lactic acid, plasma #1   Result Value Ref Range    Lactate (Lactic  Acid) 1.8 0.5 - 2.2 mmol/L   Urinalysis, Reflex to Urine Culture Urine, Clean Catch    Specimen: Urine   Result Value Ref Range    Specimen UA Urine, Clean Catch     Color, UA Yellow Yellow, Straw, Isis    Appearance, UA Clear Clear    pH, UA 6.0 5.0 - 8.0    Specific Gravity, UA 1.020 1.005 - 1.030    Protein, UA 1+ (A) Negative    Glucose, UA Negative Negative    Ketones, UA 3+ (A) Negative    Bilirubin (UA) Negative Negative    Occult Blood UA Negative Negative    Nitrite, UA Negative Negative    Urobilinogen, UA Negative <2.0 EU/dL    Leukocytes, UA Trace (A) Negative   Brain natriuretic peptide   Result Value Ref Range    BNP 18 0 - 99 pg/mL   Troponin I   Result Value Ref Range    Troponin I <0.006 0.000 - 0.026 ng/mL   Procalcitonin   Result Value Ref Range    Procalcitonin 0.04 <0.25 ng/mL   Lactic acid, plasma #2   Result Value Ref Range    Lactate (Lactic Acid) 1.3 0.5 - 2.2 mmol/L   Urinalysis Microscopic   Result Value Ref Range    RBC, UA 0 0 - 4 /hpf    WBC, UA 6 (H) 0 - 5 /hpf    Bacteria None None-Occ /hpf    Squam Epithel, UA 1 /hpf    Hyaline Casts, UA 0 0-1/lpf /lpf    Microscopic Comment SEE COMMENT    Glucose, CSF (Tube 1)   Result Value Ref Range    Glucose, CSF 65 40 - 70 mg/dL   Protein, CSF (Tube 1)   Result Value Ref Range    Protein, CSF 53 (H) 15 - 40 mg/dL   CSF cell count with differential (Tube 4)   Result Value Ref Range    Heme Aliquot 1.0 mL    Appearance, CSF Clear Clear    Color, CSF Colorless Colorless    WBC, CSF 50 (H) 0 - 5 /cu mm    RBC, CSF 0 0 /cu mm    Segmented Neutrophils, CSF 87 (H) 0 - 6 %    Lymphs, CSF 6 (L) 40 - 80 %    Mono/Macrophage, CSF 7 (L) 15 - 45 %   POCT COVID-19 Rapid Screening   Result Value Ref Range    POC Rapid COVID Negative Negative     Acceptable Yes    POCT glucose   Result Value Ref Range    POCT Glucose 96 70 - 110 mg/dL        Significant Imaging: CT Head Without Contrast  Narrative: EXAMINATION:  CT HEAD WITHOUT  CONTRAST    CLINICAL HISTORY:  Dizziness;    TECHNIQUE:  Low dose axial CT images obtained throughout the head without intravenous contrast. Sagittal and coronal reconstructions were performed.    COMPARISON:  None.    FINDINGS:  Intracranial compartment:    Ventricles and sulci are normal in size for age without evidence of hydrocephalus. No extra-axial blood or fluid collections.    The brain parenchyma appears normal. No parenchymal mass, hemorrhage, edema or major vascular distribution infarct.    Skull/extracranial contents (limited evaluation): No fracture. Mastoid air cells and paranasal sinuses are essentially clear.  Impression: No acute intracranial CT abnormality.  Clinical correlation and further evaluation as warranted.    All CT scans at this facility are performed  using dose modulation techniques as appropriate to performed exam including the following:  automated exposure control; adjustment of mA and/or kV according to the patients size (this includes techniques or standardized protocols for targeted exams where dose is matched to indication/reason for exam: i.e. extremities or head);  iterative reconstruction technique.    Electronically signed by: Oliverio Zabala  Date:    08/22/2022  Time:    22:07  X-Ray Chest AP Portable  Narrative: EXAMINATION:  XR CHEST AP PORTABLE    CLINICAL HISTORY:  Sepsis;    TECHNIQUE:  Single frontal view of the chest was performed.    COMPARISON:  05/25/2011    FINDINGS:  Left clavicular surgical fixation.    The lungs are clear, with normal appearance of pulmonary vasculature and no pleural effusion or pneumothorax.    The cardiac silhouette is normal in size. The hilar and mediastinal contours are unremarkable.    Degenerative change of the shoulders.  Impression: No acute abnormality.    Electronically signed by: Oliverio Zabala  Date:    08/22/2022  Time:    21:42

## 2022-08-23 NOTE — PLAN OF CARE
Problem: Adult Inpatient Plan of Care  Goal: Plan of Care Review  Outcome: Ongoing, Progressing  Goal: Patient-Specific Goal (Individualized)  Outcome: Ongoing, Progressing  Goal: Absence of Hospital-Acquired Illness or Injury  Outcome: Ongoing, Progressing  Goal: Optimal Comfort and Wellbeing  Outcome: Ongoing, Progressing  Goal: Readiness for Transition of Care  Outcome: Ongoing, Progressing     Problem: Infection Progression (Sepsis/Septic Shock)  Goal: Absence of Infection Signs and Symptoms  Outcome: Ongoing, Progressing

## 2022-08-23 NOTE — PROGRESS NOTES
Pharmacokinetic Initial Assessment: IV Vancomycin    Assessment/Plan:    Initiate intravenous vancomycin with loading dose of 2000 mg once followed by a maintenance dose of vancomycin 2000 mg IV every 24 hours  Desired empiric serum trough concentration is 15 to 20 mcg/mL  Draw vancomycin trough level 60 min prior to third dose on 08/25 at approximately 0400  Pharmacy will continue to follow and monitor vancomycin.      Please contact pharmacy at extension [phone] with any questions regarding this assessment.     Thank you for the consult,   Elidia Gonzalez       Patient brief summary:  Fang Quiles is a 72 y.o. female initiated on antimicrobial therapy with IV Vancomycin for treatment of suspected meningitis    Drug Allergies:   Review of patient's allergies indicates:  No Known Allergies    Actual Body Weight:   80.8 kg    Renal Function:   Estimated Creatinine Clearance: 69.9 mL/min (based on SCr of 0.8 mg/dL).,     Dialysis Method (if applicable):  N/A    CBC (last 72 hours):  Recent Labs   Lab Result Units 08/22/22  2059   WBC K/uL 13.00*   Hemoglobin g/dL 14.7   Hematocrit % 44.8   Platelets K/uL 222   Gran % % 79.1*   Lymph % % 14.8*   Mono % % 5.2   Eosinophil % % 0.0   Basophil % % 0.5   Differential Method  Automated       Metabolic Panel (last 72 hours):  Recent Labs   Lab Result Units 08/22/22  2123 08/22/22  2255 08/23/22  0007   Sodium mmol/L 133*  --   --    Potassium mmol/L 4.1  --   --    Chloride mmol/L 99  --   --    CO2 mmol/L 21*  --   --    Glucose mg/dL 114*  --   --    Glucose, CSF mg/dL  --   --  65   Glucose, UA   --  Negative  --    BUN mg/dL 13  --   --    Creatinine mg/dL 0.8  --   --    Albumin g/dL 4.0  --   --    Total Bilirubin mg/dL 0.8  --   --    Alkaline Phosphatase U/L 49*  --   --    AST U/L 17  --   --    ALT U/L 14  --   --        Drug levels (last 3 results):  No results for input(s): VANCOMYCINRA, VANCORANDOM, VANCOMYCINPE, VANCOPEAK, VANCOMYCINTR, VANCOTROUGH in the last 72  hours.    Microbiologic Results:  Microbiology Results (last 7 days)       Procedure Component Value Units Date/Time    Blood culture x two cultures. Draw prior to antibiotics. [[id number]] Collected: 08/22/22 2122    Order Status: Sent Specimen: Blood from Peripheral, Antecubital, Right Updated: 08/23/22 0102    Blood culture x two cultures. Draw prior to antibiotics. [[id number]] Collected: 08/22/22 2101    Order Status: Sent Specimen: Blood from Peripheral, Antecubital, Left Updated: 08/23/22 0102    CSF culture and Gram Stain (Tube 2) [[id number]] Collected: 08/23/22 0007    Order Status: Sent Specimen: CSF (Spinal Fluid) from CSF Tap, Tube 2 Updated: 08/23/22 0007    Influenza A & B by Molecular [[id number]] Collected: 08/22/22 2100    Order Status: Completed Specimen: Nasopharyngeal Swab Updated: 08/22/22 2206     Influenza A, Molecular Negative     Influenza B, Molecular Negative     Flu A & B Source NP

## 2022-08-24 LAB
CREAT SERPL-MCNC: 0.7 MG/DL (ref 0.5–1.4)
EST. GFR  (NO RACE VARIABLE): >60 ML/MIN/1.73 M^2

## 2022-08-24 PROCEDURE — 21400001 HC TELEMETRY ROOM

## 2022-08-24 PROCEDURE — 82565 ASSAY OF CREATININE: CPT | Performed by: EMERGENCY MEDICINE

## 2022-08-24 PROCEDURE — 63600175 PHARM REV CODE 636 W HCPCS: Performed by: FAMILY MEDICINE

## 2022-08-24 PROCEDURE — 25000003 PHARM REV CODE 250: Performed by: FAMILY MEDICINE

## 2022-08-24 PROCEDURE — 25000003 PHARM REV CODE 250: Performed by: EMERGENCY MEDICINE

## 2022-08-24 PROCEDURE — 36415 COLL VENOUS BLD VENIPUNCTURE: CPT | Performed by: EMERGENCY MEDICINE

## 2022-08-24 RX ORDER — ALPRAZOLAM 0.25 MG/1
0.25 TABLET ORAL 2 TIMES DAILY PRN
Status: DISCONTINUED | OUTPATIENT
Start: 2022-08-24 | End: 2022-08-28 | Stop reason: HOSPADM

## 2022-08-24 RX ADMIN — SODIUM CHLORIDE: 0.9 INJECTION, SOLUTION INTRAVENOUS at 01:08

## 2022-08-24 RX ADMIN — DEXAMETHASONE SODIUM PHOSPHATE 10 MG: 4 INJECTION INTRA-ARTICULAR; INTRALESIONAL; INTRAMUSCULAR; INTRAVENOUS; SOFT TISSUE at 11:08

## 2022-08-24 RX ADMIN — CEFTRIAXONE 2 G: 2 INJECTION, SOLUTION INTRAVENOUS at 02:08

## 2022-08-24 RX ADMIN — DEXAMETHASONE SODIUM PHOSPHATE 10 MG: 4 INJECTION INTRA-ARTICULAR; INTRALESIONAL; INTRAMUSCULAR; INTRAVENOUS; SOFT TISSUE at 05:08

## 2022-08-24 RX ADMIN — CEFTRIAXONE 2 G: 2 INJECTION, SOLUTION INTRAVENOUS at 01:08

## 2022-08-24 RX ADMIN — VANCOMYCIN HYDROCHLORIDE 2000 MG: 10 INJECTION, POWDER, LYOPHILIZED, FOR SOLUTION INTRAVENOUS at 04:08

## 2022-08-24 NOTE — PLAN OF CARE
Care plan reviewed with patient. Able to ge tup to the bathroom unassisted. Afebrile today. Still with mild tremors--MD aware. Free from falls. No other complaints made.

## 2022-08-24 NOTE — HOSPITAL COURSE
08/24 - Afebrile for 24 hours    08/26 - No events overnight     08/27 -Patient noted to have persistent bradycardia and elevated BP             She doesn't take BP meds at home.              Lisinopril initiated, 2D ECHO ordered and cardiology consulted               Discontinue antibiotics and steroids as likely viral meningitis               Observe for fever off abx      08/28- Patient remained stable overnight, no fever , Prolacitionin  wnl             Leukocytosis trended down and is likely reactive secondary to steroids             2D ECHO - EF 55% , Grade 1 diastolic dysfunction .              Seen and examined, stable for discharge

## 2022-08-24 NOTE — ASSESSMENT & PLAN NOTE
Sepsis due to meningitis per csf  Elevated WBC and protein  Although normal csf glucose   Indicative of viral etiology  Will cover empirically for bacterial meningitis   While cultures are pending  Vanc, rocephin, and ampicillin  H[place], [place] [facility] studies   CT reviewed  Blood cultures -ngtd

## 2022-08-24 NOTE — SUBJECTIVE & OBJECTIVE
Interval History:     Review of Systems   Constitutional:  Positive for activity change.   HENT: Negative.     Eyes: Negative.    Respiratory: Negative.     Cardiovascular: Negative.    Gastrointestinal: Negative.    Endocrine: Negative.    Musculoskeletal: Negative.    Skin: Negative.    Allergic/Immunologic: Negative.    Neurological:  Positive for tremors and light-headedness.   Hematological: Negative.    Psychiatric/Behavioral: Negative.     Objective:     Vital Signs (Most Recent):  Temp: 98 °F (36.7 °C) (08/24/22 1604)  Pulse: 80 (08/24/22 1708)  Resp: 20 (08/24/22 1604)  BP: 130/66 (08/24/22 1604)  SpO2: 95 % (08/24/22 1604) Vital Signs (24h Range):  Temp:  [96.7 °F (35.9 °C)-99.7 °F (37.6 °C)] 98 °F (36.7 °C)  Pulse:  [57-82] 80  Resp:  [16-20] 20  SpO2:  [91 %-96 %] 95 %  BP: (111-130)/(56-66) 130/66     Weight: 82 kg (180 lb 12.4 oz)  Body mass index is 27.49 kg/m².    Intake/Output Summary (Last 24 hours) at 8/24/2022 1744  Last data filed at 8/24/2022 0700  Gross per 24 hour   Intake 725.38 ml   Output 1250 ml   Net -524.62 ml      Physical Exam  Vitals reviewed.   Constitutional:       Appearance: Normal appearance. She is normal weight.   HENT:      Head: Normocephalic and atraumatic.      Nose: Nose normal.      Mouth/Throat:      Mouth: Mucous membranes are dry.   Eyes:      Conjunctiva/sclera: Conjunctivae normal.      Pupils: Pupils are equal, round, and reactive to light.   Cardiovascular:      Rate and Rhythm: Normal rate and regular rhythm.   Pulmonary:      Effort: Pulmonary effort is normal.      Breath sounds: Normal breath sounds.   Abdominal:      General: Abdomen is flat. Bowel sounds are normal.      Palpations: Abdomen is soft.   Musculoskeletal:         General: Normal range of motion.      Cervical back: Normal range of motion and neck supple.   Skin:     General: Skin is warm and dry.   Neurological:      General: No focal deficit present.      Mental Status: She is alert and  oriented to person, place, and time. Mental status is at baseline.   Psychiatric:         Mood and Affect: Mood normal.         Thought Content: Thought content normal.         Judgment: Judgment normal.       Significant Labs: All pertinent labs within the past 24 hours have been reviewed.    Significant Imaging: I have reviewed all pertinent imaging results/findings within the past 24 hours.

## 2022-08-24 NOTE — PROGRESS NOTES
[facility] Medicine  Progress Note    Patient Name: Fang Quiles  MRN: [id number]  Patient Class: IP- Inpatient   Admission Date: 8/22/2022  Length of Stay: 1 days  Attending Physician: Cele Bedolla MD  Primary Care Provider: Farida Patel MD        Subjective:     Principal Problem:Meningitis        HPI:  Patient is a 72 y.o.  female with no reported PMH who presents to the Emergency Department for evaluation of gait problems and fever x 1 day. Patient altered due to condition therefore hx obtained via family at bedside. Per report, son noticed that patient was not acting her usual self. She was having difficulty with balance and walking along with slurred speech. Symptoms all started earlier this am. No reported symptoms of cough, sob, n/v/d, or abdominal pain. Family does report chronic back pain. Patient complains of headache, blurred vision, and sensitivity to light.     In the ED, tmax 103. Chest xray and u/a unremarkable. WBC: 13k. Head CT unremarkable. LP performed by ED physician.  consulted and patient placed in observation. Patient started empirically on vanc, rocephin, ampicillin, and decadron.           Overview/Hospital Course:  08/24 - Afebrile for 24 hours      Interval History:     Review of Systems   Constitutional:  Positive for activity change.   HENT: Negative.     Eyes: Negative.    Respiratory: Negative.     Cardiovascular: Negative.    Gastrointestinal: Negative.    Endocrine: Negative.    Musculoskeletal: Negative.    Skin: Negative.    Allergic/Immunologic: Negative.    Neurological:  Positive for tremors and light-headedness.   Hematological: Negative.    Psychiatric/Behavioral: Negative.     Objective:     Vital Signs (Most Recent):  Temp: 98 °F (36.7 °C) (08/24/22 1604)  Pulse: 80 (08/24/22 1708)  Resp: 20 (08/24/22 1604)  BP: 130/66 (08/24/22 1604)  SpO2: 95 % (08/24/22 1604) Vital Signs (24h Range):  Temp:  [96.7 °F (35.9 °C)-99.7 °F (37.6  °C)] 98 °F (36.7 °C)  Pulse:  [57-82] 80  Resp:  [16-20] 20  SpO2:  [91 %-96 %] 95 %  BP: (111-130)/(56-66) 130/66     Weight: 82 kg (180 lb 12.4 oz)  Body mass index is 27.49 kg/m².    Intake/Output Summary (Last 24 hours) at 8/24/2022 1744  Last data filed at 8/24/2022 0700  Gross per 24 hour   Intake 725.38 ml   Output 1250 ml   Net -524.62 ml      Physical Exam  Vitals reviewed.   Constitutional:       Appearance: Normal appearance. She is normal weight.   HENT:      Head: Normocephalic and atraumatic.      Nose: Nose normal.      Mouth/Throat:      Mouth: Mucous membranes are dry.   Eyes:      Conjunctiva/sclera: Conjunctivae normal.      Pupils: Pupils are equal, round, and reactive to light.   Cardiovascular:      Rate and Rhythm: Normal rate and regular rhythm.   Pulmonary:      Effort: Pulmonary effort is normal.      Breath sounds: Normal breath sounds.   Abdominal:      General: Abdomen is flat. Bowel sounds are normal.      Palpations: Abdomen is soft.   Musculoskeletal:         General: Normal range of motion.      Cervical back: Normal range of motion and neck supple.   Skin:     General: Skin is warm and dry.   Neurological:      General: No focal deficit present.      Mental Status: She is alert and oriented to person, place, and time. Mental status is at baseline.   Psychiatric:         Mood and Affect: Mood normal.         Thought Content: Thought content normal.         Judgment: Judgment normal.       Significant Labs: All pertinent labs within the past 24 hours have been reviewed.    Significant Imaging: I have reviewed all pertinent imaging results/findings within the past 24 hours.      Assessment/Plan:      * Meningitis  Sepsis due to meningitis per csf  Elevated WBC and protein  Although normal csf glucose   Indicative of viral etiology  Will cover empirically for bacterial meningitis   While cultures are pending  Vanc, rocephin, and ampicillin  Hsv, [place] csf studies   CT  reviewed  Blood cultures -ngtd    Sepsis  See meningitis       Altered mental status  Secondary to cns infection   -improving   Cont to monitor         VTE Risk Mitigation (From admission, onward)         Ordered     Place sequential compression device  Until discontinued         08/23/22 0228                Discharge Planning   ELIUD:      Code Status: DNR   Is the patient medically ready for discharge?:     Reason for patient still in hospital (select all that apply): Treatment  Discharge Plan A: Home, Home with family                  Cele Bedolla MD  Department of Hospital Medicine   [facility] - Telemetry ([facility])

## 2022-08-25 LAB
ANION GAP SERPL CALC-SCNC: 11 MMOL/L (ref 8–16)
BASOPHILS # BLD AUTO: 0.02 K/UL (ref 0–0.2)
BASOPHILS NFR BLD: 0.1 % (ref 0–1.9)
BUN SERPL-MCNC: 20 MG/DL (ref 8–23)
CALCIUM SERPL-MCNC: 8.3 MG/DL (ref 8.7–10.5)
CHLORIDE SERPL-SCNC: 103 MMOL/L (ref 95–110)
CO2 SERPL-SCNC: 22 MMOL/L (ref 23–29)
CREAT SERPL-MCNC: 0.7 MG/DL (ref 0.5–1.4)
CREAT SERPL-MCNC: 0.7 MG/DL (ref 0.5–1.4)
DIFFERENTIAL METHOD: ABNORMAL
EOSINOPHIL # BLD AUTO: 0 K/UL (ref 0–0.5)
EOSINOPHIL NFR BLD: 0 % (ref 0–8)
ERYTHROCYTE [DISTWIDTH] IN BLOOD BY AUTOMATED COUNT: 13.5 % (ref 11.5–14.5)
EST. GFR  (NO RACE VARIABLE): >60 ML/MIN/1.73 M^2
EST. GFR  (NO RACE VARIABLE): >60 ML/MIN/1.73 M^2
GLUCOSE SERPL-MCNC: 211 MG/DL (ref 70–110)
HCT VFR BLD AUTO: 38.1 % (ref 37–48.5)
HGB BLD-MCNC: 12.7 G/DL (ref 12–16)
HSV1, PCR, CSF: NEGATIVE
HSV2, PCR, CSF: NEGATIVE
IMM GRANULOCYTES # BLD AUTO: 0.15 K/UL (ref 0–0.04)
IMM GRANULOCYTES NFR BLD AUTO: 0.9 % (ref 0–0.5)
LYMPHOCYTES # BLD AUTO: 0.9 K/UL (ref 1–4.8)
LYMPHOCYTES NFR BLD: 5.3 % (ref 18–48)
MCH RBC QN AUTO: 29.3 PG (ref 27–31)
MCHC RBC AUTO-ENTMCNC: 33.3 G/DL (ref 32–36)
MCV RBC AUTO: 88 FL (ref 82–98)
MONOCYTES # BLD AUTO: 0.6 K/UL (ref 0.3–1)
MONOCYTES NFR BLD: 3.4 % (ref 4–15)
NEUTROPHILS # BLD AUTO: 15.6 K/UL (ref 1.8–7.7)
NEUTROPHILS NFR BLD: 90.3 % (ref 38–73)
NRBC BLD-RTO: 0 /100 WBC
PLATELET # BLD AUTO: 251 K/UL (ref 150–450)
PMV BLD AUTO: 10.1 FL (ref 9.2–12.9)
POTASSIUM SERPL-SCNC: 4.1 MMOL/L (ref 3.5–5.1)
RBC # BLD AUTO: 4.34 M/UL (ref 4–5.4)
SODIUM SERPL-SCNC: 136 MMOL/L (ref 136–145)
VANCOMYCIN TROUGH SERPL-MCNC: 6.3 UG/ML (ref 10–22)
WBC # BLD AUTO: 17.25 K/UL (ref 3.9–12.7)
WNV RNA BLD QL NAA+PROBE: NEGATIVE
WNV RNA CSF QL NAA+PROBE: NEGATIVE

## 2022-08-25 PROCEDURE — 63600175 PHARM REV CODE 636 W HCPCS: Performed by: INTERNAL MEDICINE

## 2022-08-25 PROCEDURE — 80202 ASSAY OF VANCOMYCIN: CPT | Performed by: FAMILY MEDICINE

## 2022-08-25 PROCEDURE — 25000003 PHARM REV CODE 250: Performed by: INTERNAL MEDICINE

## 2022-08-25 PROCEDURE — 21400001 HC TELEMETRY ROOM

## 2022-08-25 PROCEDURE — 63600175 PHARM REV CODE 636 W HCPCS: Performed by: FAMILY MEDICINE

## 2022-08-25 PROCEDURE — 85025 COMPLETE CBC W/AUTO DIFF WBC: CPT | Performed by: INTERNAL MEDICINE

## 2022-08-25 PROCEDURE — 82565 ASSAY OF CREATININE: CPT | Performed by: INTERNAL MEDICINE

## 2022-08-25 PROCEDURE — 36415 COLL VENOUS BLD VENIPUNCTURE: CPT | Performed by: INTERNAL MEDICINE

## 2022-08-25 PROCEDURE — 80048 BASIC METABOLIC PNL TOTAL CA: CPT | Performed by: INTERNAL MEDICINE

## 2022-08-25 RX ADMIN — CEFTRIAXONE 2 G: 2 INJECTION, SOLUTION INTRAVENOUS at 02:08

## 2022-08-25 RX ADMIN — DEXAMETHASONE SODIUM PHOSPHATE 10 MG: 4 INJECTION INTRA-ARTICULAR; INTRALESIONAL; INTRAMUSCULAR; INTRAVENOUS; SOFT TISSUE at 05:08

## 2022-08-25 RX ADMIN — DEXAMETHASONE SODIUM PHOSPHATE 10 MG: 4 INJECTION INTRA-ARTICULAR; INTRALESIONAL; INTRAMUSCULAR; INTRAVENOUS; SOFT TISSUE at 06:08

## 2022-08-25 RX ADMIN — VANCOMYCIN HYDROCHLORIDE 2000 MG: 10 INJECTION, POWDER, LYOPHILIZED, FOR SOLUTION INTRAVENOUS at 08:08

## 2022-08-25 RX ADMIN — VANCOMYCIN HYDROCHLORIDE 2000 MG: 10 INJECTION, POWDER, LYOPHILIZED, FOR SOLUTION INTRAVENOUS at 09:08

## 2022-08-25 RX ADMIN — DEXAMETHASONE SODIUM PHOSPHATE 10 MG: 4 INJECTION INTRA-ARTICULAR; INTRALESIONAL; INTRAMUSCULAR; INTRAVENOUS; SOFT TISSUE at 11:08

## 2022-08-25 NOTE — PROGRESS NOTES
Pharmacokinetic Assessment Follow Up: IV Vancomycin    Vancomycin serum concentration assessment(s):    The trough level was drawn correctly and can be used to guide therapy at this time. The measurement is below the desired definitive target range of 15 to 20 mcg/mL.    Vancomycin Regimen Plan:    Change regimen to Vancomycin 2000 mg IV every 12 hours with next serum trough concentration measured at 07:00 prior to 3rd dose on 8/26    Drug levels (last 3 results):  Recent Labs   Lab Result Units 08/25/22  0451   Vancomycin-Trough ug/mL 6.3*       Pharmacy will continue to follow and monitor vancomycin.    Please contact pharmacy at extension [phone] for questions regarding this assessment.    Thank you for the consult,   Rigo Green       Patient brief summary:  Fang Quiles is a 72 y.o. female initiated on antimicrobial therapy with IV Vancomycin for treatment of meningitis      Drug Allergies:   Review of patient's allergies indicates:  No Known Allergies    Actual Body Weight:   84.7kg    Renal Function:   Estimated Creatinine Clearance: 81.6 mL/min (based on SCr of 0.7 mg/dL).,     Dialysis Method (if applicable):  N/A    CBC (last 72 hours):  Recent Labs   Lab Result Units 08/22/22  2059   WBC K/uL 13.00*   Hemoglobin g/dL 14.7   Hematocrit % 44.8   Platelets K/uL 222   Gran % % 79.1*   Lymph % % 14.8*   Mono % % 5.2   Eosinophil % % 0.0   Basophil % % 0.5   Differential Method  Automated       Metabolic Panel (last 72 hours):  Recent Labs   Lab Result Units 08/22/22  2123 08/22/22  2255 08/23/22  0007 08/24/22  0508 08/25/22  0451   Sodium mmol/L 133*  --   --   --   --    Potassium mmol/L 4.1  --   --   --   --    Chloride mmol/L 99  --   --   --   --    CO2 mmol/L 21*  --   --   --   --    Glucose mg/dL 114*  --   --   --   --    Glucose, CSF mg/dL  --   --  65  --   --    Glucose, UA   --  Negative  --   --   --    BUN mg/dL 13  --   --   --   --    Creatinine mg/dL 0.8  --   --  0.7 0.7   Albumin g/dL  4.0  --   --   --   --    Total Bilirubin mg/dL 0.8  --   --   --   --    Alkaline Phosphatase U/L 49*  --   --   --   --    AST U/L 17  --   --   --   --    ALT U/L 14  --   --   --   --        Vancomycin Administrations:  vancomycin given in the last 96 hours                   vancomycin 2 g in dextrose 5 % 500 mL IVPB (mg) 2,000 mg New Bag 08/24/22 0453    vancomycin 2 g in dextrose 5 % 500 mL IVPB (mg) 2,000 mg New Bag 08/23/22 0505                Microbiologic Results:  Microbiology Results (last 7 days)     Procedure Component Value Units Date/Time    Blood culture x two cultures. Draw prior to antibiotics. [[id number]] Collected: 08/22/22 2122    Order Status: Completed Specimen: Blood from Peripheral, Antecubital, Right Updated: 08/25/22 0613     Blood Culture, Routine No Growth to date      No Growth to date      No Growth to date    Narrative:      Aerobic and anaerobic    Blood culture x two cultures. Draw prior to antibiotics. [[id number]] Collected: 08/22/22 2101    Order Status: Completed Specimen: Blood from Peripheral, Antecubital, Left Updated: 08/25/22 0613     Blood Culture, Routine No Growth to date      No Growth to date      No Growth to date    Narrative:      Aerobic and anaerobic    CSF culture and Gram Stain (Tube 2) [[id number]] Collected: 08/23/22 0007    Order Status: Canceled Specimen: CSF (Spinal Fluid) from CSF Tap, Tube 2     Influenza A & B by Molecular [[id number]] Collected: 08/22/22 2100    Order Status: Completed Specimen: Nasopharyngeal Swab Updated: 08/22/22 2206     Influenza A, Molecular Negative     Influenza B, Molecular Negative     Flu A & B Source NP

## 2022-08-25 NOTE — SUBJECTIVE & OBJECTIVE
Interval History:     Review of Systems   Constitutional:  Positive for activity change.   HENT: Negative.     Eyes: Negative.    Respiratory: Negative.     Cardiovascular: Negative.    Gastrointestinal: Negative.    Endocrine: Negative.    Musculoskeletal: Negative.    Skin: Negative.    Allergic/Immunologic: Negative.    Neurological:  Positive for tremors and light-headedness.   Hematological: Negative.    Psychiatric/Behavioral: Negative.     Objective:     Vital Signs (Most Recent):  Temp: 99 °F (37.2 °C) (08/25/22 1500)  Pulse: 60 (08/25/22 1500)  Resp: 14 (08/25/22 1500)  BP: (!) 152/76 (08/25/22 1500)  SpO2: 98 % (08/25/22 1500) Vital Signs (24h Range):  Temp:  [98.3 °F (36.8 °C)-99 °F (37.2 °C)] 99 °F (37.2 °C)  Pulse:  [51-65] 60  Resp:  [14-18] 14  SpO2:  [90 %-98 %] 98 %  BP: (103-158)/(51-76) 152/76     Weight: 84.7 kg (186 lb 11.7 oz)  Body mass index is 28.39 kg/m².    Intake/Output Summary (Last 24 hours) at 8/25/2022 1733  Last data filed at 8/24/2022 1906  Gross per 24 hour   Intake 73.03 ml   Output --   Net 73.03 ml        Physical Exam  Vitals reviewed.   Constitutional:       Appearance: Normal appearance. She is normal weight.   HENT:      Head: Normocephalic and atraumatic.      Nose: Nose normal.      Mouth/Throat:      Mouth: Mucous membranes are dry.   Eyes:      Conjunctiva/sclera: Conjunctivae normal.      Pupils: Pupils are equal, round, and reactive to light.   Cardiovascular:      Rate and Rhythm: Normal rate and regular rhythm.   Pulmonary:      Effort: Pulmonary effort is normal.      Breath sounds: Normal breath sounds.   Abdominal:      General: Abdomen is flat. Bowel sounds are normal.      Palpations: Abdomen is soft.   Musculoskeletal:         General: Normal range of motion.      Cervical back: Normal range of motion and neck supple.   Skin:     General: Skin is warm and dry.   Neurological:      General: No focal deficit present.      Mental Status: She is alert and oriented  to person, place, and time. Mental status is at baseline.   Psychiatric:         Mood and Affect: Mood normal.         Thought Content: Thought content normal.         Judgment: Judgment normal.       Significant Labs: All pertinent labs within the past 24 hours have been reviewed.    Significant Imaging: I have reviewed all pertinent imaging results/findings within the past 24 hours.

## 2022-08-25 NOTE — PROGRESS NOTES
[facility] Medicine  Progress Note    Patient Name: Fang Quiles  MRN: [id number]  Patient Class: IP- Inpatient   Admission Date: 8/22/2022  Length of Stay: 2 days  Attending Physician: Cele Bedolla MD  Primary Care Provider: Farida Patel MD        Subjective:     Principal Problem:Meningitis        HPI:  Patient is a 72 y.o.  female with no reported PMH who presents to the Emergency Department for evaluation of gait problems and fever x 1 day. Patient altered due to condition therefore hx obtained via family at bedside. Per report, son noticed that patient was not acting her usual self. She was having difficulty with balance and walking along with slurred speech. Symptoms all started earlier this am. No reported symptoms of cough, sob, n/v/d, or abdominal pain. Family does report chronic back pain. Patient complains of headache, blurred vision, and sensitivity to light.     In the ED, tmax 103. Chest xray and u/a unremarkable. WBC: 13k. Head CT unremarkable. LP performed by ED physician.  consulted and patient placed in observation. Patient started empirically on vanc, rocephin, ampicillin, and decadron.           Overview/Hospital Course:  08/24 - Afebrile for 24 hours    08/25 - No events overnight       Interval History:     Review of Systems   Constitutional:  Positive for activity change.   HENT: Negative.     Eyes: Negative.    Respiratory: Negative.     Cardiovascular: Negative.    Gastrointestinal: Negative.    Endocrine: Negative.    Musculoskeletal: Negative.    Skin: Negative.    Allergic/Immunologic: Negative.    Neurological:  Positive for tremors and light-headedness.   Hematological: Negative.    Psychiatric/Behavioral: Negative.     Objective:     Vital Signs (Most Recent):  Temp: 99 °F (37.2 °C) (08/25/22 1500)  Pulse: 60 (08/25/22 1500)  Resp: 14 (08/25/22 1500)  BP: (!) 152/76 (08/25/22 1500)  SpO2: 98 % (08/25/22 1500) Vital Signs (24h  Range):  Temp:  [98.3 °F (36.8 °C)-99 °F (37.2 °C)] 99 °F (37.2 °C)  Pulse:  [51-65] 60  Resp:  [14-18] 14  SpO2:  [90 %-98 %] 98 %  BP: (103-158)/(51-76) 152/76     Weight: 84.7 kg (186 lb 11.7 oz)  Body mass index is 28.39 kg/m².    Intake/Output Summary (Last 24 hours) at 8/25/2022 1733  Last data filed at 8/24/2022 1906  Gross per 24 hour   Intake 73.03 ml   Output --   Net 73.03 ml        Physical Exam  Vitals reviewed.   Constitutional:       Appearance: Normal appearance. She is normal weight.   HENT:      Head: Normocephalic and atraumatic.      Nose: Nose normal.      Mouth/Throat:      Mouth: Mucous membranes are dry.   Eyes:      Conjunctiva/sclera: Conjunctivae normal.      Pupils: Pupils are equal, round, and reactive to light.   Cardiovascular:      Rate and Rhythm: Normal rate and regular rhythm.   Pulmonary:      Effort: Pulmonary effort is normal.      Breath sounds: Normal breath sounds.   Abdominal:      General: Abdomen is flat. Bowel sounds are normal.      Palpations: Abdomen is soft.   Musculoskeletal:         General: Normal range of motion.      Cervical back: Normal range of motion and neck supple.   Skin:     General: Skin is warm and dry.   Neurological:      General: No focal deficit present.      Mental Status: She is alert and oriented to person, place, and time. Mental status is at baseline.   Psychiatric:         Mood and Affect: Mood normal.         Thought Content: Thought content normal.         Judgment: Judgment normal.       Significant Labs: All pertinent labs within the past 24 hours have been reviewed.    Significant Imaging: I have reviewed all pertinent imaging results/findings within the past 24 hours.      Assessment/Plan:      * Meningitis  Sepsis due to meningitis per csf  Elevated WBC and protein  Although normal csf glucose   Indicative of viral etiology   empirically for bacterial meningitis  Vanc, rocephin, and ampicillin  [place], [place] csf studies   CT  reviewed  Blood cultures -ngtd    Sepsis  See meningitis   resolved      Altered mental status  Secondary to cns infection   -improving   Cont to monitor         VTE Risk Mitigation (From admission, onward)         Ordered     Place sequential compression device  Until discontinued         08/23/22 0228                Discharge Planning   ELIUD:      Code Status: DNR   Is the patient medically ready for discharge?:     Reason for patient still in hospital (select all that apply): Treatment  Discharge Plan A: Home, Home with family                  Cele Bedolla MD  Department of Hospital Medicine   [facility] Tele[facility] ([facility])

## 2022-08-25 NOTE — PLAN OF CARE
Problem: Adult Inpatient Plan of Care  Goal: Plan of Care Review  Outcome: Ongoing, Progressing  Goal: Patient-Specific Goal (Individualized)  Outcome: Ongoing, Progressing  Goal: Absence of Hospital-Acquired Illness or Injury  Outcome: Ongoing, Progressing  Goal: Optimal Comfort and Wellbeing  Outcome: Ongoing, Progressing  Goal: Readiness for Transition of Care  Outcome: Ongoing, Progressing     Problem: Adjustment to Illness (Sepsis/Septic Shock)  Goal: Optimal Coping  Outcome: Ongoing, Progressing     Problem: Bleeding (Sepsis/Septic Shock)  Goal: Absence of Bleeding  Outcome: Ongoing, Progressing     Problem: Glycemic Control Impaired (Sepsis/Septic Shock)  Goal: Blood Glucose Level Within Desired Range  Outcome: Ongoing, Progressing     Problem: Infection Progression (Sepsis/Septic Shock)  Goal: Absence of Infection Signs and Symptoms  Outcome: Ongoing, Progressing     Problem: Nutrition Impaired (Sepsis/Septic Shock)  Goal: Optimal Nutrition Intake  Outcome: Ongoing, Progressing     Problem: Fall Injury Risk  Goal: Absence of Fall and Fall-Related Injury  Outcome: Ongoing, Progressing     Problem: Fever  Goal: Body Temperature in Desired Range  Outcome: Ongoing, Progressing

## 2022-08-25 NOTE — PHYSICIAN QUERY
PT Name: Fang Quiles  MR #: [id number]    DOCUMENTATION CLARIFICATION     CDS/: Melania AGUDELO       Contact information: [email or web address]  This form is a permanent document in the medical record.     Query Date: August 25, 2022    By submitting this query, we are merely seeking further clarification of documentation. Please utilize your independent clinical judgment when addressing the question(s) below.    The Medical Record contains the following:   Indicators   Supporting Clinical Findings Location in Medical Record   X AMS, Confusion,  LOC, etc.  Son reports that his mom is not acting herself, seems confused and having trouble with balance and walking.  He thought her speech was slurred today.  Last known well time this am    Pt reports trouble with balance since yesterday   presents to the Emergency Department for evaluation of gait problems and fever x 1 day. Patient altered due to condition therefore hx obtained via family at bedside. Per report, son noticed that patient was not acting her usual self. She was having difficulty with balance and walking along with slurred speech. Symptoms all started earlier this am    Altered mental status secondary to CNS infection   ER MD 8/22 M Robyn GUIDRY      H&P 8/23 L Jennifer GUIDRY   X Acute/Chronic Illness Sepsis d/t Meningitis  Altered mental status secondary to CNS infection   H&P 8/23 L Jennifer GUIDRY   X Radiology Findings CT Head 8/22  No acute intracranial CT abnormality.  Clinical correlation and further evaluation as warranted.   Radiology     X Electrolyte Imbalance 8/22 Na = 133    CO2=21 Lab         X Medication Meningitis per CSF indicative for viral etiology  Will cover empirically for bacterial meningitis   While cultures are pending  Vanc, rocephin, and ampicillin   H&P 8/23 L Jennifer GUIDRY    Treatment             X Other CSF  WBC= 50  Segmental neutrophils = 87  lymphs = 6  Glucose 65  Protein 53 Lab     The noted clinical guidelines are only system guidelines  and do not replace the providers clinical judgment.    The National [place] of Neurologic Disorders and Stroke (NINDS) of the NIH describes encephalopathy as any diffuse disease of the brain that alters brain function or structure.    Provider, please specify the diagnosis or diagnoses associated with above clinical findings.    [  x ] Metabolic Encephalopathy - Due to electrolyte imbalance, metabolic derangements, or infectious processes, includes Septic Encephalopathy, Uremic Encephalopathy     [   ] Encephalopathy, unspecified        [   ] Other Encephalopathy (please specify): ____________________     [   ] Other neurological condition- Includes Post-ictal altered mental status (please specify condition): __________     [   ]  Clinically Undetermined     Please document in your progress notes daily for the duration of treatment until resolved, and include in your discharge summary.    References:  FATMATA Arellano RN, CCDS. (2018, June 9). Notes from the Instructor: Encephalopathy tips. Retrieved October 22, 2020, from [email or web address]/articles/[email or web address]-[email or web address]-encephalopathy-tips    ICD-9-CM Coding Clinic First Quarter 2013, Effective with discharges: October 21, 2013 [facility] § Seizure with encephalopathy due to postictal state (2013).    ICD-10-CM/PCS Dials Integrated Codebook (Version V.20.8.10.0) [Computer software]. (2020). Retrieved October 21, 2020.    National [place] of Neurological Disorders and Stroke. (2019, March 27). Retrieved October 22, 2020, from https://www.ninds.nih.gov/Disorders/All-Disorders/Fvfrynkfqgwyko-Pbypdfivyke-Kfgf    Form No. 19277

## 2022-08-25 NOTE — ASSESSMENT & PLAN NOTE
Sepsis due to meningitis per csf  Elevated WBC and protein  Although normal csf glucose   Indicative of viral etiology   empirically for bacterial meningitis  Vanc, rocephin, and ampicillin  Hsv[place] csf studies   CT reviewed  Blood cultures -ngtd

## 2022-08-25 NOTE — PLAN OF CARE
Patient doing well, resting quietly in bed. VSS, no c/o discomfort or SOB. No fevers today, received multiple antibiotics today. Eating well, family at bedside.

## 2022-08-26 LAB
CREAT SERPL-MCNC: 0.7 MG/DL (ref 0.5–1.4)
EST. GFR  (NO RACE VARIABLE): >60 ML/MIN/1.73 M^2
VANCOMYCIN TROUGH SERPL-MCNC: 16.2 UG/ML (ref 10–22)

## 2022-08-26 PROCEDURE — 63600175 PHARM REV CODE 636 W HCPCS: Performed by: FAMILY MEDICINE

## 2022-08-26 PROCEDURE — 82565 ASSAY OF CREATININE: CPT | Performed by: INTERNAL MEDICINE

## 2022-08-26 PROCEDURE — 80202 ASSAY OF VANCOMYCIN: CPT | Performed by: INTERNAL MEDICINE

## 2022-08-26 PROCEDURE — 21400001 HC TELEMETRY ROOM

## 2022-08-26 PROCEDURE — 63600175 PHARM REV CODE 636 W HCPCS: Performed by: INTERNAL MEDICINE

## 2022-08-26 PROCEDURE — 25000003 PHARM REV CODE 250: Performed by: INTERNAL MEDICINE

## 2022-08-26 RX ADMIN — CEFTRIAXONE 2 G: 2 INJECTION, SOLUTION INTRAVENOUS at 03:08

## 2022-08-26 RX ADMIN — DEXAMETHASONE SODIUM PHOSPHATE 10 MG: 4 INJECTION INTRA-ARTICULAR; INTRALESIONAL; INTRAMUSCULAR; INTRAVENOUS; SOFT TISSUE at 12:08

## 2022-08-26 RX ADMIN — DEXAMETHASONE SODIUM PHOSPHATE 10 MG: 4 INJECTION INTRA-ARTICULAR; INTRALESIONAL; INTRAMUSCULAR; INTRAVENOUS; SOFT TISSUE at 01:08

## 2022-08-26 RX ADMIN — VANCOMYCIN HYDROCHLORIDE 2000 MG: 10 INJECTION, POWDER, LYOPHILIZED, FOR SOLUTION INTRAVENOUS at 08:08

## 2022-08-26 RX ADMIN — DEXAMETHASONE SODIUM PHOSPHATE 10 MG: 4 INJECTION INTRA-ARTICULAR; INTRALESIONAL; INTRAMUSCULAR; INTRAVENOUS; SOFT TISSUE at 05:08

## 2022-08-26 RX ADMIN — VANCOMYCIN HYDROCHLORIDE 2000 MG: 10 INJECTION, POWDER, LYOPHILIZED, FOR SOLUTION INTRAVENOUS at 09:08

## 2022-08-26 RX ADMIN — CEFTRIAXONE 2 G: 2 INJECTION, SOLUTION INTRAVENOUS at 01:08

## 2022-08-26 RX ADMIN — DEXAMETHASONE SODIUM PHOSPHATE 10 MG: 4 INJECTION INTRA-ARTICULAR; INTRALESIONAL; INTRAMUSCULAR; INTRAVENOUS; SOFT TISSUE at 11:08

## 2022-08-26 NOTE — SUBJECTIVE & OBJECTIVE
Interval History:     Review of Systems   Constitutional:  Positive for activity change.   HENT: Negative.     Eyes: Negative.    Respiratory: Negative.     Cardiovascular: Negative.    Gastrointestinal: Negative.    Endocrine: Negative.    Musculoskeletal: Negative.    Skin: Negative.    Allergic/Immunologic: Negative.    Neurological:  Positive for tremors and light-headedness.   Hematological: Negative.    Psychiatric/Behavioral: Negative.     Objective:     Vital Signs (Most Recent):  Temp: 98.3 °F (36.8 °C) (08/26/22 1535)  Pulse: (!) 51 (08/26/22 1535)  Resp: 18 (08/26/22 1535)  BP: (!) 171/75 (08/26/22 1535)  SpO2: (!) 94 % (08/26/22 1535) Vital Signs (24h Range):  Temp:  [97.9 °F (36.6 °C)-98.3 °F (36.8 °C)] 98.3 °F (36.8 °C)  Pulse:  [41-61] 51  Resp:  [18-20] 18  SpO2:  [93 %-96 %] 94 %  BP: (115-171)/(66-79) 171/75     Weight: 84.2 kg (185 lb 10 oz)  Body mass index is 28.22 kg/m².    Intake/Output Summary (Last 24 hours) at 8/26/2022 1550  Last data filed at 8/26/2022 1000  Gross per 24 hour   Intake 358 ml   Output --   Net 358 ml        Physical Exam  Vitals reviewed.   Constitutional:       Appearance: Normal appearance. She is normal weight.   HENT:      Head: Normocephalic and atraumatic.      Nose: Nose normal.      Mouth/Throat:      Mouth: Mucous membranes are dry.   Eyes:      Conjunctiva/sclera: Conjunctivae normal.      Pupils: Pupils are equal, round, and reactive to light.   Cardiovascular:      Rate and Rhythm: Normal rate and regular rhythm.   Pulmonary:      Effort: Pulmonary effort is normal.      Breath sounds: Normal breath sounds.   Abdominal:      General: Abdomen is flat. Bowel sounds are normal.      Palpations: Abdomen is soft.   Musculoskeletal:         General: Normal range of motion.      Cervical back: Normal range of motion and neck supple.   Skin:     General: Skin is warm and dry.   Neurological:      General: No focal deficit present.      Mental Status: She is alert and  oriented to person, place, and time. Mental status is at baseline.   Psychiatric:         Mood and Affect: Mood normal.         Thought Content: Thought content normal.         Judgment: Judgment normal.       Significant Labs: All pertinent labs within the past 24 hours have been reviewed.    Significant Imaging: I have reviewed all pertinent imaging results/findings within the past 24 hours.

## 2022-08-26 NOTE — PROGRESS NOTES
Pharmacokinetic Assessment Follow Up: IV Vancomycin    Vancomycin serum concentration assessment(s):    The trough level was drawn correctly and can be used to guide therapy at this time. The measurement is within the desired definitive target range of 15 to 20 mcg/mL.    Vancomycin Regimen Plan:    Continue regimen to Vancomycin 2000 mg IV every 12 hours with next serum trough concentration measured at 2000 prior to 4th dose on 8/27    Drug levels (last 3 results):  Recent Labs   Lab Result Units 08/25/22  0451 08/26/22  0811   Vancomycin-Trough ug/mL 6.3* 16.2       Pharmacy will continue to follow and monitor vancomycin.    Please contact pharmacy at extension [phone] for questions regarding this assessment.    Thank you for the consult,   Ade Ash       Patient brief summary:  Fang Quiles is a 72 y.o. female initiated on antimicrobial therapy with IV Vancomycin for treatment of meningitis    The patient's current regimen is 2000mg every 12 hours     Drug Allergies:   Review of patient's allergies indicates:  No Known Allergies    Actual Body Weight:   84.2kg    Renal Function:   Estimated Creatinine Clearance: 81.4 mL/min (based on SCr of 0.7 mg/dL).,     Dialysis Method (if applicable):  N/A    CBC (last 72 hours):  Recent Labs   Lab Result Units 08/25/22  1145   WBC K/uL 17.25*   Hemoglobin g/dL 12.7   Hematocrit % 38.1   Platelets K/uL 251   Gran % % 90.3*   Lymph % % 5.3*   Mono % % 3.4*   Eosinophil % % 0.0   Basophil % % 0.1   Differential Method  Automated       Metabolic Panel (last 72 hours):  Recent Labs   Lab Result Units 08/24/22  0508 08/25/22  0451 08/25/22  1145 08/26/22  0811   Sodium mmol/L  --   --  136  --    Potassium mmol/L  --   --  4.1  --    Chloride mmol/L  --   --  103  --    CO2 mmol/L  --   --  22*  --    Glucose mg/dL  --   --  211*  --    BUN mg/dL  --   --  20  --    Creatinine mg/dL 0.7 0.7 0.7 0.7       Vancomycin Administrations:  vancomycin given in the last 96  hours                     vancomycin 2 g in dextrose 5 % 500 mL IVPB (mg) 2,000 mg New Bag 08/26/22 0817     2,000 mg New Bag 08/25/22 2045     2,000 mg New Bag  0922    vancomycin 2 g in dextrose 5 % 500 mL IVPB (mg) 2,000 mg New Bag 08/24/22 0453    vancomycin 2 g in dextrose 5 % 500 mL IVPB (mg) 2,000 mg New Bag 08/23/22 0505                    Microbiologic Results:  Microbiology Results (last 7 days)       Procedure Component Value Units Date/Time    Blood culture x two cultures. Draw prior to antibiotics. [[id number]] Collected: 08/22/22 2122    Order Status: Completed Specimen: Blood from Peripheral, Antecubital, Right Updated: 08/26/22 0612     Blood Culture, Routine No Growth to date      No Growth to date      No Growth to date      No Growth to date    Narrative:      Aerobic and anaerobic    Blood culture x two cultures. Draw prior to antibiotics. [[id number]] Collected: 08/22/22 2101    Order Status: Completed Specimen: Blood from Peripheral, Antecubital, Left Updated: 08/26/22 0612     Blood Culture, Routine No Growth to date      No Growth to date      No Growth to date      No Growth to date    Narrative:      Aerobic and anaerobic    CSF culture and Gram Stain (Tube 2) [[id number]] Collected: 08/23/22 0007    Order Status: Canceled Specimen: CSF (Spinal Fluid) from CSF Tap, Tube 2     Influenza A & B by Molecular [[id number]] Collected: 08/22/22 2100    Order Status: Completed Specimen: Nasopharyngeal Swab Updated: 08/22/22 2206     Influenza A, Molecular Negative     Influenza B, Molecular Negative     Flu A & B Source NP

## 2022-08-26 NOTE — PROGRESS NOTES
[facility] Medicine  Progress Note    Patient Name: Fang Quiles  MRN: [id number]  Patient Class: IP- Inpatient   Admission Date: 8/22/2022  Length of Stay: 3 days  Attending Physician: Cele Bedolla MD  Primary Care Provider: Farida Patel MD        Subjective:     Principal Problem:Meningitis        HPI:  Patient is a 72 y.o.  female with no reported PMH who presents to the Emergency Department for evaluation of gait problems and fever x 1 day. Patient altered due to condition therefore hx obtained via family at bedside. Per report, son noticed that patient was not acting her usual self. She was having difficulty with balance and walking along with slurred speech. Symptoms all started earlier this am. No reported symptoms of cough, sob, n/v/d, or abdominal pain. Family does report chronic back pain. Patient complains of headache, blurred vision, and sensitivity to light.     In the ED, tmax 103. Chest xray and u/a unremarkable. WBC: 13k. Head CT unremarkable. LP performed by ED physician.  consulted and patient placed in observation. Patient started empirically on vanc, rocephin, ampicillin, and decadron.           Overview/Hospital Course:  08/24 - Afebrile for 24 hours    08/26 - No events overnight       Interval History:     Review of Systems   Constitutional:  Positive for activity change.   HENT: Negative.     Eyes: Negative.    Respiratory: Negative.     Cardiovascular: Negative.    Gastrointestinal: Negative.    Endocrine: Negative.    Musculoskeletal: Negative.    Skin: Negative.    Allergic/Immunologic: Negative.    Neurological:  Positive for tremors and light-headedness.   Hematological: Negative.    Psychiatric/Behavioral: Negative.     Objective:     Vital Signs (Most Recent):  Temp: 98.3 °F (36.8 °C) (08/26/22 1535)  Pulse: (!) 51 (08/26/22 1535)  Resp: 18 (08/26/22 1535)  BP: (!) 171/75 (08/26/22 1535)  SpO2: (!) 94 % (08/26/22 1535) Vital Signs (24h  Range):  Temp:  [97.9 °F (36.6 °C)-98.3 °F (36.8 °C)] 98.3 °F (36.8 °C)  Pulse:  [41-61] 51  Resp:  [18-20] 18  SpO2:  [93 %-96 %] 94 %  BP: (115-171)/(66-79) 171/75     Weight: 84.2 kg (185 lb 10 oz)  Body mass index is 28.22 kg/m².    Intake/Output Summary (Last 24 hours) at 8/26/2022 1550  Last data filed at 8/26/2022 1000  Gross per 24 hour   Intake 358 ml   Output --   Net 358 ml        Physical Exam  Vitals reviewed.   Constitutional:       Appearance: Normal appearance. She is normal weight.   HENT:      Head: Normocephalic and atraumatic.      Nose: Nose normal.      Mouth/Throat:      Mouth: Mucous membranes are dry.   Eyes:      Conjunctiva/sclera: Conjunctivae normal.      Pupils: Pupils are equal, round, and reactive to light.   Cardiovascular:      Rate and Rhythm: Normal rate and regular rhythm.   Pulmonary:      Effort: Pulmonary effort is normal.      Breath sounds: Normal breath sounds.   Abdominal:      General: Abdomen is flat. Bowel sounds are normal.      Palpations: Abdomen is soft.   Musculoskeletal:         General: Normal range of motion.      Cervical back: Normal range of motion and neck supple.   Skin:     General: Skin is warm and dry.   Neurological:      General: No focal deficit present.      Mental Status: She is alert and oriented to person, place, and time. Mental status is at baseline.   Psychiatric:         Mood and Affect: Mood normal.         Thought Content: Thought content normal.         Judgment: Judgment normal.       Significant Labs: All pertinent labs within the past 24 hours have been reviewed.    Significant Imaging: I have reviewed all pertinent imaging results/findings within the past 24 hours.      Assessment/Plan:      * Meningitis  Sepsis due to meningitis per csf  Elevated WBC and protein  Although normal csf glucose   Indicative of viral etiology   empirically for bacterial meningitis  Vanc, rocephin, and ampicillin  Hsv[place] csf studies   CT  reviewed  Blood cultures -ngtd    Sepsis  See meningitis   resolved      Altered mental status  Secondary to cns infection   -improving   Cont to monitor         VTE Risk Mitigation (From admission, onward)         Ordered     Place sequential compression device  Until discontinued         08/23/22 0228                Discharge Planning   ELIUD:      Code Status: DNR   Is the patient medically ready for discharge?:     Reason for patient still in hospital (select all that apply):   Discharge Plan A: Home, Home with family                  Cele Bedolla MD  Department of Hospital Medicine   [facility] - Tele[facility] ([facility])

## 2022-08-27 PROBLEM — I10 BENIGN ESSENTIAL HTN: Status: ACTIVE | Noted: 2022-08-27

## 2022-08-27 PROBLEM — R50.9 FEVER: Status: RESOLVED | Noted: 2022-08-23 | Resolved: 2022-08-27

## 2022-08-27 PROBLEM — R00.1 BRADYCARDIA: Status: ACTIVE | Noted: 2022-08-27

## 2022-08-27 LAB
ANION GAP SERPL CALC-SCNC: 14 MMOL/L (ref 8–16)
AORTIC ROOT ANNULUS: 3.47 CM
ASCENDING AORTA: 3.13 CM
AV INDEX (PROSTH): 0.75
AV MEAN GRADIENT: 4 MMHG
AV PEAK GRADIENT: 8 MMHG
AV REGURGITATION PRESSURE HALF TIME: 1129.96 MS
AV VALVE AREA: 2.35 CM2
AV VELOCITY RATIO: 0.77
BASOPHILS # BLD AUTO: 0.03 K/UL (ref 0–0.2)
BASOPHILS NFR BLD: 0.2 % (ref 0–1.9)
BSA FOR ECHO PROCEDURE: 1.96 M2
BUN SERPL-MCNC: 17 MG/DL (ref 8–23)
CALCIUM SERPL-MCNC: 8.5 MG/DL (ref 8.7–10.5)
CHLORIDE SERPL-SCNC: 103 MMOL/L (ref 95–110)
CO2 SERPL-SCNC: 18 MMOL/L (ref 23–29)
CREAT SERPL-MCNC: 0.8 MG/DL (ref 0.5–1.4)
CV ECHO LV RWT: 0.73 CM
DIFFERENTIAL METHOD: ABNORMAL
DOP CALC AO PEAK VEL: 1.41 M/S
DOP CALC AO VTI: 32.2 CM
DOP CALC LVOT AREA: 3.1 CM2
DOP CALC LVOT DIAMETER: 1.99 CM
DOP CALC LVOT PEAK VEL: 1.08 M/S
DOP CALC LVOT STROKE VOLUME: 75.54 CM3
DOP CALC RVOT PEAK VEL: 0.6 M/S
DOP CALC RVOT VTI: 10.4 CM
DOP CALCLVOT PEAK VEL VTI: 24.3 CM
E WAVE DECELERATION TIME: 456.77 MSEC
E/A RATIO: 0.41
E/E' RATIO: 5.54 M/S
ECHO LV POSTERIOR WALL: 1.2 CM (ref 0.6–1.1)
EJECTION FRACTION: 55 %
EOSINOPHIL # BLD AUTO: 0 K/UL (ref 0–0.5)
EOSINOPHIL NFR BLD: 0 % (ref 0–8)
ERYTHROCYTE [DISTWIDTH] IN BLOOD BY AUTOMATED COUNT: 13.6 % (ref 11.5–14.5)
EST. GFR  (NO RACE VARIABLE): >60 ML/MIN/1.73 M^2
FRACTIONAL SHORTENING: 40 % (ref 28–44)
GLUCOSE SERPL-MCNC: 319 MG/DL (ref 70–110)
HCT VFR BLD AUTO: 45.8 % (ref 37–48.5)
HGB BLD-MCNC: 14.3 G/DL (ref 12–16)
IMM GRANULOCYTES # BLD AUTO: 0.17 K/UL (ref 0–0.04)
IMM GRANULOCYTES NFR BLD AUTO: 1.1 % (ref 0–0.5)
INTERVENTRICULAR SEPTUM: 1.2 CM (ref 0.6–1.1)
IVC DIAMETER: 0.91 CM
IVRT: 95.15 MSEC
LA MAJOR: 5.76 CM
LA MINOR: 5.73 CM
LA WIDTH: 3 CM
LEFT ATRIUM SIZE: 4.38 CM
LEFT ATRIUM VOLUME INDEX: 33.1 ML/M2
LEFT ATRIUM VOLUME: 64.17 CM3
LEFT INTERNAL DIMENSION IN SYSTOLE: 1.96 CM (ref 2.1–4)
LEFT VENTRICLE DIASTOLIC VOLUME INDEX: 22.34 ML/M2
LEFT VENTRICLE DIASTOLIC VOLUME: 43.34 ML
LEFT VENTRICLE MASS INDEX: 64 G/M2
LEFT VENTRICLE SYSTOLIC VOLUME INDEX: 6.3 ML/M2
LEFT VENTRICLE SYSTOLIC VOLUME: 12.14 ML
LEFT VENTRICULAR INTERNAL DIMENSION IN DIASTOLE: 3.28 CM (ref 3.5–6)
LEFT VENTRICULAR MASS: 123.71 G
LV LATERAL E/E' RATIO: 4.5 M/S
LV SEPTAL E/E' RATIO: 7.2 M/S
LVOT MG: 2.35 MMHG
LVOT MV: 0.71 CM/S
LYMPHOCYTES # BLD AUTO: 0.9 K/UL (ref 1–4.8)
LYMPHOCYTES NFR BLD: 5.9 % (ref 18–48)
MCH RBC QN AUTO: 28.6 PG (ref 27–31)
MCHC RBC AUTO-ENTMCNC: 31.2 G/DL (ref 32–36)
MCV RBC AUTO: 92 FL (ref 82–98)
MONOCYTES # BLD AUTO: 0.5 K/UL (ref 0.3–1)
MONOCYTES NFR BLD: 3.1 % (ref 4–15)
MV PEAK A VEL: 0.88 M/S
MV PEAK E VEL: 0.36 M/S
MV STENOSIS PRESSURE HALF TIME: 132.46 MS
MV VALVE AREA P 1/2 METHOD: 1.66 CM2
NEUTROPHILS # BLD AUTO: 14 K/UL (ref 1.8–7.7)
NEUTROPHILS NFR BLD: 89.7 % (ref 38–73)
NRBC BLD-RTO: 0 /100 WBC
PISA AR MAX VEL: 4.24 M/S
PISA MRMAX VEL: 4.58 M/S
PISA TR MAX VEL: 2.68 M/S
PLATELET # BLD AUTO: 338 K/UL (ref 150–450)
PMV BLD AUTO: 10.6 FL (ref 9.2–12.9)
POTASSIUM SERPL-SCNC: 4.2 MMOL/L (ref 3.5–5.1)
PROCALCITONIN SERPL IA-MCNC: 0.02 NG/ML
PV MEAN GRADIENT: 0.71 MMHG
RA MAJOR: 4.05 CM
RA PRESSURE: 3 MMHG
RA WIDTH: 3.1 CM
RBC # BLD AUTO: 5 M/UL (ref 4–5.4)
SODIUM SERPL-SCNC: 135 MMOL/L (ref 136–145)
STJ: 3.15 CM
T4 FREE SERPL-MCNC: 1 NG/DL (ref 0.71–1.51)
TDI LATERAL: 0.08 M/S
TDI SEPTAL: 0.05 M/S
TDI: 0.07 M/S
TR MAX PG: 29 MMHG
TRICUSPID ANNULAR PLANE SYSTOLIC EXCURSION: 2 CM
TSH SERPL DL<=0.005 MIU/L-ACNC: 0.4 UIU/ML (ref 0.4–4)
TV REST PULMONARY ARTERY PRESSURE: 32 MMHG
WBC # BLD AUTO: 15.58 K/UL (ref 3.9–12.7)

## 2022-08-27 PROCEDURE — 25000003 PHARM REV CODE 250: Performed by: INTERNAL MEDICINE

## 2022-08-27 PROCEDURE — 21400001 HC TELEMETRY ROOM

## 2022-08-27 PROCEDURE — 85025 COMPLETE CBC W/AUTO DIFF WBC: CPT | Performed by: INTERNAL MEDICINE

## 2022-08-27 PROCEDURE — 84145 PROCALCITONIN (PCT): CPT | Performed by: INTERNAL MEDICINE

## 2022-08-27 PROCEDURE — 63600175 PHARM REV CODE 636 W HCPCS: Performed by: INTERNAL MEDICINE

## 2022-08-27 PROCEDURE — 84443 ASSAY THYROID STIM HORMONE: CPT | Performed by: INTERNAL MEDICINE

## 2022-08-27 PROCEDURE — 63600175 PHARM REV CODE 636 W HCPCS: Performed by: FAMILY MEDICINE

## 2022-08-27 PROCEDURE — 80048 BASIC METABOLIC PNL TOTAL CA: CPT | Performed by: INTERNAL MEDICINE

## 2022-08-27 PROCEDURE — 99223 PR INITIAL HOSPITAL CARE,LEVL III: ICD-10-PCS | Mod: 25,,, | Performed by: INTERNAL MEDICINE

## 2022-08-27 PROCEDURE — 36415 COLL VENOUS BLD VENIPUNCTURE: CPT | Performed by: INTERNAL MEDICINE

## 2022-08-27 PROCEDURE — 99223 1ST HOSP IP/OBS HIGH 75: CPT | Mod: 25,,, | Performed by: INTERNAL MEDICINE

## 2022-08-27 PROCEDURE — 84439 ASSAY OF FREE THYROXINE: CPT | Performed by: INTERNAL MEDICINE

## 2022-08-27 RX ORDER — LISINOPRIL 10 MG/1
10 TABLET ORAL DAILY
Status: DISCONTINUED | OUTPATIENT
Start: 2022-08-27 | End: 2022-08-28 | Stop reason: HOSPADM

## 2022-08-27 RX ORDER — ATORVASTATIN CALCIUM 10 MG/1
10 TABLET, FILM COATED ORAL NIGHTLY
Status: DISCONTINUED | OUTPATIENT
Start: 2022-08-27 | End: 2022-08-28 | Stop reason: HOSPADM

## 2022-08-27 RX ADMIN — CEFTRIAXONE 2 G: 2 INJECTION, SOLUTION INTRAVENOUS at 01:08

## 2022-08-27 RX ADMIN — ATORVASTATIN CALCIUM 10 MG: 10 TABLET, FILM COATED ORAL at 08:08

## 2022-08-27 RX ADMIN — LISINOPRIL 10 MG: 10 TABLET ORAL at 12:08

## 2022-08-27 RX ADMIN — VANCOMYCIN HYDROCHLORIDE 2000 MG: 10 INJECTION, POWDER, LYOPHILIZED, FOR SOLUTION INTRAVENOUS at 08:08

## 2022-08-27 RX ADMIN — DEXAMETHASONE SODIUM PHOSPHATE 10 MG: 4 INJECTION INTRA-ARTICULAR; INTRALESIONAL; INTRAMUSCULAR; INTRAVENOUS; SOFT TISSUE at 05:08

## 2022-08-27 NOTE — PROGRESS NOTES
[facility] Medicine  Progress Note    Patient Name: Fang Quiles  MRN: [id number]  Patient Class: IP- Inpatient   Admission Date: 8/22/2022  Length of Stay: 4 days  Attending Physician: Cele Bedolla MD  Primary Care Provider: Farida Patel MD        Subjective:     Principal Problem:Meningitis        HPI:  Patient is a 72 y.o.  female with no reported PMH who presents to the Emergency Department for evaluation of gait problems and fever x 1 day. Patient altered due to condition therefore hx obtained via family at bedside. Per report, son noticed that patient was not acting her usual self. She was having difficulty with balance and walking along with slurred speech. Symptoms all started earlier this am. No reported symptoms of cough, sob, n/v/d, or abdominal pain. Family does report chronic back pain. Patient complains of headache, blurred vision, and sensitivity to light.     In the ED, tmax 103. Chest xray and u/a unremarkable. WBC: 13k. Head CT unremarkable. LP performed by ED physician.  consulted and patient placed in observation. Patient started empirically on vanc, rocephin, ampicillin, and decadron.           Overview/Hospital Course:  08/24 - Afebrile for 24 hours    08/26 - No events overnight     08/27 -Patient noted to have persistent bradycardia and elevated BP             She doesn't take BP meds at home.              Lisinopril initiated, 2D ECHO ordered and cardiology consulted               Discontinue antibiotics and steroids as likely viral meningitis               Observe for fever off abx        Interval History:     Review of Systems   Constitutional:  Positive for activity change.   HENT: Negative.     Eyes: Negative.    Respiratory: Negative.     Cardiovascular: Negative.    Gastrointestinal: Negative.    Endocrine: Negative.    Musculoskeletal: Negative.    Skin: Negative.    Allergic/Immunologic: Negative.    Neurological:  Positive for tremors  and light-headedness.   Hematological: Negative.    Psychiatric/Behavioral: Negative.     Objective:     Vital Signs (Most Recent):  Temp: 99.2 °F (37.3 °C) (08/27/22 1542)  Pulse: (!) 50 (08/27/22 1542)  Resp: 18 (08/27/22 1542)  BP: (!) 161/74 (08/27/22 1542)  SpO2: 96 % (08/27/22 1542) Vital Signs (24h Range):  Temp:  [97.7 °F (36.5 °C)-99.2 °F (37.3 °C)] 99.2 °F (37.3 °C)  Pulse:  [40-69] 50  Resp:  [18-19] 18  SpO2:  [93 %-97 %] 96 %  BP: (153-174)/(66-85) 161/74     Weight: 80.3 kg (177 lb)  Body mass index is 26.91 kg/m².    Intake/Output Summary (Last 24 hours) at 8/27/2022 1609  Last data filed at 8/27/2022 0410  Gross per 24 hour   Intake 2628.88 ml   Output --   Net 2628.88 ml        Physical Exam  Vitals reviewed.   Constitutional:       Appearance: Normal appearance. She is normal weight.   HENT:      Head: Normocephalic and atraumatic.      Nose: Nose normal.      Mouth/Throat:      Mouth: Mucous membranes are dry.   Eyes:      Conjunctiva/sclera: Conjunctivae normal.      Pupils: Pupils are equal, round, and reactive to light.   Cardiovascular:      Rate and Rhythm: Regular rhythm. Bradycardia present.      Pulses: Normal pulses.      Heart sounds: Normal heart sounds.   Pulmonary:      Effort: Pulmonary effort is normal.      Breath sounds: Normal breath sounds.   Abdominal:      General: Abdomen is flat. Bowel sounds are normal.      Palpations: Abdomen is soft.   Musculoskeletal:         General: Normal range of motion.      Cervical back: Normal range of motion and neck supple.   Skin:     General: Skin is warm and dry.   Neurological:      General: No focal deficit present.      Mental Status: She is alert and oriented to person, place, and time. Mental status is at baseline.   Psychiatric:         Mood and Affect: Mood normal.         Thought Content: Thought content normal.         Judgment: Judgment normal.       Significant Labs: All pertinent labs within the past 24 hours have been  reviewed.    Significant Imaging: I have reviewed all pertinent imaging results/findings within the past 24 hours.      Assessment/Plan:      * Meningitis  Sepsis due to meningitis per csf  Elevated WBC and protein  Although normal csf glucose   Indicative of viral etiology   empirically for bacterial meningitis  Vanc, rocephin, and ampicillin  Hsv, [place] csf studies   CT reviewed  Blood cultures -ngtd        08/27- monitor off abx   Follow up -procal       Benign essential HTN  Patient doesn't take BP meds  Lisinopril initiated      Bradycardia  Consult cardiology  2 D echo -pending      Sepsis  See meningitis   resolved      Altered mental status  Secondary to cns infection   -improving   Cont to monitor     08/27 -resolved        VTE Risk Mitigation (From admission, onward)         Ordered     Place sequential compression device  Until discontinued         08/23/22 0228                Discharge Planning   ELIUD:      Code Status: DNR   Is the patient medically ready for discharge?:     Reason for patient still in hospital (select all that apply): Treatment  Discharge Plan A: Home, Home with family                  Cele Bedolla MD  Department of Hospital Medicine   [facility] Tele[facility] ([facility])

## 2022-08-27 NOTE — ASSESSMENT & PLAN NOTE
Sepsis due to meningitis per csf  Elevated WBC and protein  Although normal csf glucose   Indicative of viral etiology   empirically for bacterial meningitis  Vanc, rocephin, and ampicillin  Hsv[place] csf studies   CT reviewed  Blood cultures -ngtd        08/27- monitor off abx   Follow up -procal

## 2022-08-27 NOTE — HPI
Patient is a 72 y.o.  female with no reported PMH who presents to the Emergency Department for evaluation of gait problems and fever x 1 day. Patient altered due to condition therefore hx obtained via family at bedside. Per report, son noticed that patient was not acting her usual self. She was having difficulty with balance and walking along with slurred speech. Symptoms all started earlier this am. No reported symptoms of cough, sob, n/v/d, or abdominal pain. Family does report chronic back pain. Patient complains of headache, blurred vision, and sensitivity to light.   In the ED, tmax 103. Chest xray and u/a unremarkable. WBC: 13k. Head CT unremarkable. LP performed by ED physician. HM consulted and patient placed in observation. Patient started empirically on vanc, rocephin, ampicillin, and decadron.        CARDIOLOGY consulted for bradycardia, currently HR is 60 , slowest was 40 sinus christo  No pauses , asymptomatic, in bed since admission states falling asleep frequently  No history of syncope , Not on AVN blockers

## 2022-08-27 NOTE — SUBJECTIVE & OBJECTIVE
Interval History:     Review of Systems   Constitutional:  Positive for activity change.   HENT: Negative.     Eyes: Negative.    Respiratory: Negative.     Cardiovascular: Negative.    Gastrointestinal: Negative.    Endocrine: Negative.    Musculoskeletal: Negative.    Skin: Negative.    Allergic/Immunologic: Negative.    Neurological:  Positive for tremors and light-headedness.   Hematological: Negative.    Psychiatric/Behavioral: Negative.     Objective:     Vital Signs (Most Recent):  Temp: 99.2 °F (37.3 °C) (08/27/22 1542)  Pulse: (!) 50 (08/27/22 1542)  Resp: 18 (08/27/22 1542)  BP: (!) 161/74 (08/27/22 1542)  SpO2: 96 % (08/27/22 1542) Vital Signs (24h Range):  Temp:  [97.7 °F (36.5 °C)-99.2 °F (37.3 °C)] 99.2 °F (37.3 °C)  Pulse:  [40-69] 50  Resp:  [18-19] 18  SpO2:  [93 %-97 %] 96 %  BP: (153-174)/(66-85) 161/74     Weight: 80.3 kg (177 lb)  Body mass index is 26.91 kg/m².    Intake/Output Summary (Last 24 hours) at 8/27/2022 1609  Last data filed at 8/27/2022 0410  Gross per 24 hour   Intake 2628.88 ml   Output --   Net 2628.88 ml        Physical Exam  Vitals reviewed.   Constitutional:       Appearance: Normal appearance. She is normal weight.   HENT:      Head: Normocephalic and atraumatic.      Nose: Nose normal.      Mouth/Throat:      Mouth: Mucous membranes are dry.   Eyes:      Conjunctiva/sclera: Conjunctivae normal.      Pupils: Pupils are equal, round, and reactive to light.   Cardiovascular:      Rate and Rhythm: Regular rhythm. Bradycardia present.      Pulses: Normal pulses.      Heart sounds: Normal heart sounds.   Pulmonary:      Effort: Pulmonary effort is normal.      Breath sounds: Normal breath sounds.   Abdominal:      General: Abdomen is flat. Bowel sounds are normal.      Palpations: Abdomen is soft.   Musculoskeletal:         General: Normal range of motion.      Cervical back: Normal range of motion and neck supple.   Skin:     General: Skin is warm and dry.   Neurological:       General: No focal deficit present.      Mental Status: She is alert and oriented to person, place, and time. Mental status is at baseline.   Psychiatric:         Mood and Affect: Mood normal.         Thought Content: Thought content normal.         Judgment: Judgment normal.       Significant Labs: All pertinent labs within the past 24 hours have been reviewed.    Significant Imaging: I have reviewed all pertinent imaging results/findings within the past 24 hours.

## 2022-08-27 NOTE — CONSULTS
Pharmacy Consult Sign Off    Therapy with vancomycin is complete and/or consult has been discontinued by the provider.   Pharmacy will sign off. Please re-consult as needed.     Thank you for allowing us to participate in this patient's care.     Wilfred Michaels PharmD 8/27/2022 9:41 AM

## 2022-08-27 NOTE — PLAN OF CARE
Plan of care reviewed with pt and son at bedside, verbalized understanding. A&O x4. IV intact, dry, and clean. Medications given with no complications. IV antibiotics given per order. No pain reported. Pt ambulates with standby assistance, turns in bed independently. On room air. SB on monitor. Instructed to call for assistance. Hourly rounding completed.

## 2022-08-28 VITALS
WEIGHT: 182.75 LBS | DIASTOLIC BLOOD PRESSURE: 55 MMHG | TEMPERATURE: 98 F | HEART RATE: 80 BPM | HEIGHT: 68 IN | RESPIRATION RATE: 18 BRPM | SYSTOLIC BLOOD PRESSURE: 104 MMHG | BODY MASS INDEX: 27.7 KG/M2 | OXYGEN SATURATION: 96 %

## 2022-08-28 PROBLEM — G03.9 MENINGITIS: Status: RESOLVED | Noted: 2022-08-23 | Resolved: 2022-08-28

## 2022-08-28 PROBLEM — R41.82 ALTERED MENTAL STATUS: Status: RESOLVED | Noted: 2022-08-23 | Resolved: 2022-08-28

## 2022-08-28 PROBLEM — A41.9 SEPSIS: Status: RESOLVED | Noted: 2022-08-23 | Resolved: 2022-08-28

## 2022-08-28 LAB
BACTERIA BLD CULT: NORMAL
BACTERIA BLD CULT: NORMAL
BASOPHILS # BLD AUTO: 0.04 K/UL (ref 0–0.2)
BASOPHILS NFR BLD: 0.3 % (ref 0–1.9)
DIFFERENTIAL METHOD: ABNORMAL
EOSINOPHIL # BLD AUTO: 0.1 K/UL (ref 0–0.5)
EOSINOPHIL NFR BLD: 0.5 % (ref 0–8)
ERYTHROCYTE [DISTWIDTH] IN BLOOD BY AUTOMATED COUNT: 13.5 % (ref 11.5–14.5)
HCT VFR BLD AUTO: 44.6 % (ref 37–48.5)
HGB BLD-MCNC: 14.4 G/DL (ref 12–16)
IMM GRANULOCYTES # BLD AUTO: 0.23 K/UL (ref 0–0.04)
IMM GRANULOCYTES NFR BLD AUTO: 1.6 % (ref 0–0.5)
LYMPHOCYTES # BLD AUTO: 3 K/UL (ref 1–4.8)
LYMPHOCYTES NFR BLD: 21.4 % (ref 18–48)
MCH RBC QN AUTO: 28.5 PG (ref 27–31)
MCHC RBC AUTO-ENTMCNC: 32.3 G/DL (ref 32–36)
MCV RBC AUTO: 88 FL (ref 82–98)
MONOCYTES # BLD AUTO: 1 K/UL (ref 0.3–1)
MONOCYTES NFR BLD: 7 % (ref 4–15)
NEUTROPHILS # BLD AUTO: 9.7 K/UL (ref 1.8–7.7)
NEUTROPHILS NFR BLD: 69.2 % (ref 38–73)
NRBC BLD-RTO: 0 /100 WBC
PLATELET # BLD AUTO: 284 K/UL (ref 150–450)
PMV BLD AUTO: 10.5 FL (ref 9.2–12.9)
RBC # BLD AUTO: 5.05 M/UL (ref 4–5.4)
WBC # BLD AUTO: 13.95 K/UL (ref 3.9–12.7)

## 2022-08-28 PROCEDURE — 99232 SBSQ HOSP IP/OBS MODERATE 35: CPT | Mod: ,,, | Performed by: INTERNAL MEDICINE

## 2022-08-28 PROCEDURE — 99232 PR SUBSEQUENT HOSPITAL CARE,LEVL II: ICD-10-PCS | Mod: ,,, | Performed by: INTERNAL MEDICINE

## 2022-08-28 PROCEDURE — 25000003 PHARM REV CODE 250: Performed by: INTERNAL MEDICINE

## 2022-08-28 PROCEDURE — 36415 COLL VENOUS BLD VENIPUNCTURE: CPT | Performed by: INTERNAL MEDICINE

## 2022-08-28 PROCEDURE — 85025 COMPLETE CBC W/AUTO DIFF WBC: CPT | Performed by: INTERNAL MEDICINE

## 2022-08-28 RX ORDER — LISINOPRIL 10 MG/1
10 TABLET ORAL DAILY
Qty: 90 TABLET | Refills: 3 | Status: ON HOLD | OUTPATIENT
Start: 2022-08-29 | End: 2023-03-07 | Stop reason: HOSPADM

## 2022-08-28 RX ADMIN — LISINOPRIL 10 MG: 10 TABLET ORAL at 08:08

## 2022-08-28 NOTE — SUBJECTIVE & OBJECTIVE
Interval History:     Review of Systems   Constitutional:  Positive for activity change.   HENT: Negative.     Eyes: Negative.    Respiratory: Negative.     Cardiovascular: Negative.    Gastrointestinal: Negative.    Endocrine: Negative.    Musculoskeletal: Negative.    Skin: Negative.    Allergic/Immunologic: Negative.    Neurological:  Positive for tremors and light-headedness.   Hematological: Negative.    Psychiatric/Behavioral: Negative.     Objective:     Vital Signs (Most Recent):  Temp: 98 °F (36.7 °C) (08/27/22 1922)  Pulse: 65 (08/27/22 1922)  Resp: 18 (08/27/22 1922)  BP: 118/70 (08/27/22 1922)  SpO2: 96 % (08/27/22 1922) Vital Signs (24h Range):  Temp:  [97.9 °F (36.6 °C)-99.2 °F (37.3 °C)] 98 °F (36.7 °C)  Pulse:  [40-69] 65  Resp:  [18] 18  SpO2:  [93 %-97 %] 96 %  BP: (118-174)/(70-85) 118/70     Weight: 80.3 kg (177 lb)  Body mass index is 26.91 kg/m².    Intake/Output Summary (Last 24 hours) at 8/27/2022 1932  Last data filed at 8/27/2022 0410  Gross per 24 hour   Intake 2118.88 ml   Output --   Net 2118.88 ml        Physical Exam  Vitals reviewed.   Constitutional:       Appearance: Normal appearance. She is normal weight.   HENT:      Head: Normocephalic and atraumatic.      Nose: Nose normal.      Mouth/Throat:      Mouth: Mucous membranes are dry.   Eyes:      Conjunctiva/sclera: Conjunctivae normal.      Pupils: Pupils are equal, round, and reactive to light.   Cardiovascular:      Rate and Rhythm: Regular rhythm. Bradycardia present.      Pulses: Normal pulses.      Heart sounds: Normal heart sounds.   Pulmonary:      Effort: Pulmonary effort is normal.      Breath sounds: Normal breath sounds.   Abdominal:      General: Abdomen is flat. Bowel sounds are normal.      Palpations: Abdomen is soft.   Musculoskeletal:         General: Normal range of motion.      Cervical back: Normal range of motion and neck supple.   Skin:     General: Skin is warm and dry.   Neurological:      General: No focal  deficit present.      Mental Status: She is alert and oriented to person, place, and time. Mental status is at baseline.   Psychiatric:         Mood and Affect: Mood normal.         Thought Content: Thought content normal.         Judgment: Judgment normal.       Significant Labs: All pertinent labs within the past 24 hours have been reviewed.    Significant Imaging: I have reviewed all pertinent imaging results/findings within the past 24 hours.  Review of Systems   Constitutional: Positive for activity change.   HENT: Negative.     Eyes: Negative.    Cardiovascular: Negative.    Respiratory: Negative.     Endocrine: Negative.    Hematologic/Lymphatic: Negative.    Skin: Negative.    Musculoskeletal: Negative.    Gastrointestinal: Negative.    Neurological:  Positive for light-headedness and tremors.   Psychiatric/Behavioral: Negative.     Allergic/Immunologic: Negative.    Physical Exam  Vitals reviewed.   Constitutional:       Appearance: Normal appearance. She is normal weight.   HENT:      Head: Normocephalic and atraumatic.      Nose: Nose normal.      Mouth/Throat:      Mouth: Mucous membranes are dry.   Eyes:      Conjunctiva/sclera: Conjunctivae normal.      Pupils: Pupils are equal, round, and reactive to light.   Cardiovascular:      Rate and Rhythm: Regular rhythm. Bradycardia present.      Pulses: Normal pulses.      Heart sounds: Normal heart sounds.   Pulmonary:      Effort: Pulmonary effort is normal.      Breath sounds: Normal breath sounds.   Abdominal:      General: Abdomen is flat. Bowel sounds are normal.      Palpations: Abdomen is soft.   Musculoskeletal:         General: Normal range of motion.      Cervical back: Normal range of motion and neck supple.   Skin:     General: Skin is warm and dry.   Neurological:      General: No focal deficit present.      Mental Status: She is alert and oriented to person, place, and time. Mental status is at baseline.   Psychiatric:         Mood and Affect:  Mood normal.         Thought Content: Thought content normal.         Judgment: Judgment normal.

## 2022-08-28 NOTE — ASSESSMENT & PLAN NOTE
Asymptomatic   No pauses   No high degree block   No AVN blocking agents   Check TSH   Follow up as OP

## 2022-08-28 NOTE — DISCHARGE SUMMARY
[facility] - Telemetry ([facility] Medicine  Discharge Summary      Patient Name: Fang Quiles  MRN: [id number]  Patient Class: IP- Inpatient  Admission Date: 8/22/2022  Hospital Length of Stay: 5 days  Discharge Date and Time:  08/28/2022 10:21 AM  Attending Physician: Cele Bedolla MD   Discharging Provider: Cele Bedolla MD  Primary Care Provider: Farida Patel MD      HPI:   Patient is a 72 y.o.  female with no reported PMH who presents to the Emergency Department for evaluation of gait problems and fever x 1 day. Patient altered due to condition therefore hx obtained via family at bedside. Per report, son noticed that patient was not acting her usual self. She was having difficulty with balance and walking along with slurred speech. Symptoms all started earlier this am. No reported symptoms of cough, sob, n/v/d, or abdominal pain. Family does report chronic back pain. Patient complains of headache, blurred vision, and sensitivity to light.     In the ED, tmax 103. Chest xray and u/a unremarkable. WBC: 13k. Head CT unremarkable. LP performed by ED physician. HM consulted and patient placed in observation. Patient started empirically on vanc, rocephin, ampicillin, and decadron.           * No surgery found *      Hospital Course:   08/24 - Afebrile for 24 hours    08/26 - No events overnight     08/27 -Patient noted to have persistent bradycardia and elevated BP             She doesn't take BP meds at home.              Lisinopril initiated, 2D ECHO ordered and cardiology consulted               Discontinue antibiotics and steroids as likely viral meningitis               Observe for fever off abx      08/28- Patient remained stable overnight, no fever , Prolacitionin  wnl             Leukocytosis trended down and is likely reactive secondary to steroids             2D ECHO - EF 55% , Grade 1 diastolic dysfunction .              Seen and examined, stable for discharge                                                 Goals of Care Treatment Preferences:  Code Status: DNR      Consults:   Consults (From admission, onward)        Status Ordering Provider     Inpatient consult to Cardiology  Once        Provider:  Tod Snowden MD    Completed RAJANI URENA          No new Assessment & Plan notes have been filed under this hospital service since the last note was generated.  Service: Hospital Medicine    Final Active Diagnoses:    Diagnosis Date Noted POA    Bradycardia [R00.1] 08/27/2022 Yes    Benign essential HTN [I10] 08/27/2022 Yes      Problems Resolved During this Admission:    Diagnosis Date Noted Date Resolved POA    PRINCIPAL PROBLEM:  Meningitis [G03.9] 08/23/2022 08/28/2022 Yes    Altered mental status [R41.82] 08/23/2022 08/28/2022 Yes    Sepsis [A41.9] 08/23/2022 08/28/2022 Yes       Discharged Condition: good    Disposition: Home or Self Care    Follow Up:    Patient Instructions:   No discharge procedures on file.    Significant Diagnostic Studies: Labs:   BMP:   Recent Labs   Lab 08/27/22  0952   *   *   K 4.2      CO2 18*   BUN 17   CREATININE 0.8   CALCIUM 8.5*   , CMP   Recent Labs   Lab 08/27/22  0952   *   K 4.2      CO2 18*   *   BUN 17   CREATININE 0.8   CALCIUM 8.5*   ANIONGAP 14    and CBC   Recent Labs   Lab 08/27/22  0952 08/28/22  0552   WBC 15.58* 13.95*   HGB 14.3 14.4   HCT 45.8 44.6    284       Pending Diagnostic Studies:     Procedure Component Value Units Date/Time    Freeze and Hold, [facility] [[id number]] Collected: 08/23/22 0007    Order Status: Sent Lab Status: No result     Specimen: CSF (Spinal Fluid) from Cerebrospinal Fluid          Medications:  Reconciled Home Medications:      Medication List      START taking these medications    lisinopriL 10 MG tablet  Take 1 tablet (10 mg total) by mouth once daily.  Start taking on: August 29, 2022        CONTINUE taking these medications    rosuvastatin 20 MG  tablet  Commonly known as: CRESTOR  Take 40 mg by mouth every evening.            Indwelling Lines/Drains at time of discharge:   Lines/Drains/Airways     None                 Time spent on the discharge of patient: 40  minutes         Cele Bedolla MD  Department of Hospital Medicine  [facility] - Telemetry ([facility])

## 2022-08-28 NOTE — CONSULTS
[facility] - Telemetry ([facility])  Cardiology  Consult Note    Patient Name: Fang Quiles  MRN: [id number]  Admission Date: 8/22/2022  Hospital Length of Stay: 4 days  Code Status: DNR   Attending Provider: Cele Bedolla MD   Consulting Provider: Tod Snowden MD  Primary Care Physician: Farida Patel MD  Principal Problem:Meningitis    Patient information was obtained from patient, past medical records and ER records.     Inpatient consult to Cardiology  Consult performed by: Tod Snowden MD  Consult ordered by: Cele Bedolla MD  Reason for consult: BRADYCARDIA        Subjective:     Chief Complaint:  bradycardia     HPI:   Patient is a 72 y.o.  female with no reported PMH who presents to the Emergency Department for evaluation of gait problems and fever x 1 day. Patient altered due to condition therefore hx obtained via family at bedside. Per report, son noticed that patient was not acting her usual self. She was having difficulty with balance and walking along with slurred speech. Symptoms all started earlier this am. No reported symptoms of cough, sob, n/v/d, or abdominal pain. Family does report chronic back pain. Patient complains of headache, blurred vision, and sensitivity to light.   In the ED, tmax 103. Chest xray and u/a unremarkable. WBC: 13k. Head CT unremarkable. LP performed by ED physician. HM consulted and patient placed in observation. Patient started empirically on vanc, rocephin, ampicillin, and decadron.        CARDIOLOGY consulted for bradycardia, currently HR is 60 , slowest was 40 sinus christo  No pauses , asymptomatic, in bed since admission states falling asleep frequently  No history of syncope , Not on AVN blockers           Interval History:     Review of Systems   Constitutional:  Positive for activity change.   HENT: Negative.     Eyes: Negative.    Respiratory: Negative.     Cardiovascular: Negative.    Gastrointestinal: Negative.    Endocrine: Negative.     Musculoskeletal: Negative.    Skin: Negative.    Allergic/Immunologic: Negative.    Neurological:  Positive for tremors and light-headedness.   Hematological: Negative.    Psychiatric/Behavioral: Negative.     Objective:     Vital Signs (Most Recent):  Temp: 98 °F (36.7 °C) (08/27/22 1922)  Pulse: 65 (08/27/22 1922)  Resp: 18 (08/27/22 1922)  BP: 118/70 (08/27/22 1922)  SpO2: 96 % (08/27/22 1922) Vital Signs (24h Range):  Temp:  [97.9 °F (36.6 °C)-99.2 °F (37.3 °C)] 98 °F (36.7 °C)  Pulse:  [40-69] 65  Resp:  [18] 18  SpO2:  [93 %-97 %] 96 %  BP: (118-174)/(70-85) 118/70     Weight: 80.3 kg (177 lb)  Body mass index is 26.91 kg/m².    Intake/Output Summary (Last 24 hours) at 8/27/2022 1932  Last data filed at 8/27/2022 0410  Gross per 24 hour   Intake 2118.88 ml   Output --   Net 2118.88 ml        Physical Exam  Vitals reviewed.   Constitutional:       Appearance: Normal appearance. She is normal weight.   HENT:      Head: Normocephalic and atraumatic.      Nose: Nose normal.      Mouth/Throat:      Mouth: Mucous membranes are dry.   Eyes:      Conjunctiva/sclera: Conjunctivae normal.      Pupils: Pupils are equal, round, and reactive to light.   Cardiovascular:      Rate and Rhythm: Regular rhythm. Bradycardia present.      Pulses: Normal pulses.      Heart sounds: Normal heart sounds.   Pulmonary:      Effort: Pulmonary effort is normal.      Breath sounds: Normal breath sounds.   Abdominal:      General: Abdomen is flat. Bowel sounds are normal.      Palpations: Abdomen is soft.   Musculoskeletal:         General: Normal range of motion.      Cervical back: Normal range of motion and neck supple.   Skin:     General: Skin is warm and dry.   Neurological:      General: No focal deficit present.      Mental Status: She is alert and oriented to person, place, and time. Mental status is at baseline.   Psychiatric:         Mood and Affect: Mood normal.         Thought Content: Thought content normal.          Judgment: Judgment normal.       Significant Labs: All pertinent labs within the past 24 hours have been reviewed.    Significant Imaging: I have reviewed all pertinent imaging results/findings within the past 24 hours.  Review of Systems   Constitutional: Positive for activity change.   HENT: Negative.     Eyes: Negative.    Cardiovascular: Negative.    Respiratory: Negative.     Endocrine: Negative.    Hematologic/Lymphatic: Negative.    Skin: Negative.    Musculoskeletal: Negative.    Gastrointestinal: Negative.    Neurological:  Positive for light-headedness and tremors.   Psychiatric/Behavioral: Negative.     Allergic/Immunologic: Negative.    Physical Exam  Vitals reviewed.   Constitutional:       Appearance: Normal appearance. She is normal weight.   HENT:      Head: Normocephalic and atraumatic.      Nose: Nose normal.      Mouth/Throat:      Mouth: Mucous membranes are dry.   Eyes:      Conjunctiva/sclera: Conjunctivae normal.      Pupils: Pupils are equal, round, and reactive to light.   Cardiovascular:      Rate and Rhythm: Regular rhythm. Bradycardia present.      Pulses: Normal pulses.      Heart sounds: Normal heart sounds.   Pulmonary:      Effort: Pulmonary effort is normal.      Breath sounds: Normal breath sounds.   Abdominal:      General: Abdomen is flat. Bowel sounds are normal.      Palpations: Abdomen is soft.   Musculoskeletal:         General: Normal range of motion.      Cervical back: Normal range of motion and neck supple.   Skin:     General: Skin is warm and dry.   Neurological:      General: No focal deficit present.      Mental Status: She is alert and oriented to person, place, and time. Mental status is at baseline.   Psychiatric:         Mood and Affect: Mood normal.         Thought Content: Thought content normal.         Judgment: Judgment normal.       Assessment and Plan:     * Meningitis  Viral or aseptic meningitis   As Per      Benign essential HTN  On lisinopril and  norvasc    Bradycardia  Asymptomatic   No pauses   No high degree block   No AVN blocking agents   Check TSH   Follow up as OP           VTE Risk Mitigation (From admission, onward)         Ordered     Place sequential compression device  Until discontinued         08/23/22 4769                Thank you for your consult. I will follow-up with patient. Please contact us if you have any additional questions.    Tod Snowden MD  Cardiology   [facility] - Telemetry ([facility])

## 2022-08-28 NOTE — PLAN OF CARE
Plan of care reviewed with pt and son at bedside, verbalized understanding. A&O x4. IV intact, dry, and clean. Medications given with no complications. On room air. No pain reported. Pt ambulates with assistance x1, turns in bed independently. NS on monitor. Instructed to call for assistance. Hourly rounding completed.

## 2022-08-28 NOTE — PLAN OF CARE
[facility] - Telemetry (Hospital)  Discharge Final Note    Primary Care Provider: Farida Patel MD    Expected Discharge Date: 8/28/2022    Final Discharge Note (most recent)       Final Note - 08/28/22 1112          Final Note    Assessment Type Final Discharge Note (P)      Anticipated Discharge Disposition Home or Self Care (P)         Post-Acute Status    Discharge Delays None known at this time (P)                      Important Message from Medicare        Shruthi Knutson LMSW 8/28/2022 11:13 AM

## 2022-08-29 NOTE — SUBJECTIVE & OBJECTIVE
Interval History:     Review of Systems   Constitutional:  Positive for activity change.   HENT: Negative.     Eyes: Negative.    Respiratory: Negative.     Cardiovascular: Negative.    Gastrointestinal: Negative.    Endocrine: Negative.    Musculoskeletal: Negative.    Skin: Negative.    Allergic/Immunologic: Negative.    Neurological:  Positive for tremors and light-headedness.   Hematological: Negative.    Psychiatric/Behavioral: Negative.     Objective:     Vital Signs (Most Recent):  Temp: 98.4 °F (36.9 °C) (08/28/22 1156)  Pulse: 80 (08/28/22 1156)  Resp: 18 (08/28/22 1156)  BP: (!) 104/55 (08/28/22 1156)  SpO2: 96 % (08/28/22 1156) Vital Signs (24h Range):  Temp:  [97.7 °F (36.5 °C)-98.4 °F (36.9 °C)] 98.4 °F (36.9 °C)  Pulse:  [50-80] 80  Resp:  [18] 18  SpO2:  [95 %-98 %] 96 %  BP: (104-163)/(55-86) 104/55     Weight: 82.9 kg (182 lb 12.2 oz)  Body mass index is 27.79 kg/m².  No intake or output data in the 24 hours ending 08/28/22 2320     Physical Exam  Vitals reviewed.   Constitutional:       Appearance: Normal appearance. She is normal weight.   HENT:      Head: Normocephalic and atraumatic.      Nose: Nose normal.      Mouth/Throat:      Mouth: Mucous membranes are dry.   Eyes:      Conjunctiva/sclera: Conjunctivae normal.      Pupils: Pupils are equal, round, and reactive to light.   Cardiovascular:      Rate and Rhythm: Normal rate and regular rhythm.      Pulses: Normal pulses.      Heart sounds: Normal heart sounds.   Pulmonary:      Effort: Pulmonary effort is normal.      Breath sounds: Normal breath sounds.   Abdominal:      General: Abdomen is flat. Bowel sounds are normal.      Palpations: Abdomen is soft.   Musculoskeletal:         General: Normal range of motion.      Cervical back: Normal range of motion and neck supple.   Skin:     General: Skin is warm and dry.   Neurological:      General: No focal deficit present.      Mental Status: She is alert and oriented to person, place, and time.  Mental status is at baseline.   Psychiatric:         Mood and Affect: Mood normal.         Thought Content: Thought content normal.         Judgment: Judgment normal.       Significant Labs: All pertinent labs within the past 24 hours have been reviewed.    Significant Imaging: I have reviewed all pertinent imaging results/findings within the past 24 hours.

## 2022-08-29 NOTE — HOSPITAL COURSE
Patient is a 72 y.o.  female with no reported PMH who presents to the Emergency Department for evaluation of gait problems and fever x 1 day. Patient altered due to condition therefore hx obtained via family at bedside. Per report, son noticed that patient was not acting her usual self. She was having difficulty with balance and walking along with slurred speech. Symptoms all started earlier this am. No reported symptoms of cough, sob, n/v/d, or abdominal pain. Family does report chronic back pain. Patient complains of headache, blurred vision, and sensitivity to light.   In the ED, tmax 103. Chest xray and u/a unremarkable. WBC: 13k. Head CT unremarkable. LP performed by ED physician. HM consulted and patient placed in observation. Patient started empirically on vanc, rocephin, ampicillin, and decadron.        CARDIOLOGY consulted for bradycardia, currently HR is 60 , slowest was 40 sinus christo  No pauses , asymptomatic, in bed since admission states falling asleep frequently  No history of syncope , Not on AVN blockers    8/28/2022  No events on tele  Sinus bradycardia resolved   Tsh borderline low, free t4 normal

## 2022-08-29 NOTE — ASSESSMENT & PLAN NOTE
Asymptomatic   No pauses   No high degree block   No AVN blocking agents   Check TSH   Follow up as OP     8/28/2022  Normal free t4, tsh borderline low   No further cardiac work up , no events on tele

## 2022-08-29 NOTE — PROGRESS NOTES
[facility] ([facility])  Cardiology  Progress Note    Patient Name: Fang Quiles  MRN: [id number]  Admission Date: 8/22/2022  Hospital Length of Stay: 5 days  Code Status: Prior   Attending Physician: No att. providers found   Primary Care Physician: Farida Patel MD  Expected Discharge Date: 8/28/2022  Principal Problem:Meningitis    Subjective:     Hospital Course:   Patient is a 72 y.o.  female with no reported PMH who presents to the Emergency Department for evaluation of gait problems and fever x 1 day. Patient altered due to condition therefore hx obtained via family at bedside. Per report, son noticed that patient was not acting her usual self. She was having difficulty with balance and walking along with slurred speech. Symptoms all started earlier this am. No reported symptoms of cough, sob, n/v/d, or abdominal pain. Family does report chronic back pain. Patient complains of headache, blurred vision, and sensitivity to light.   In the ED, tmax 103. Chest xray and u/a unremarkable. WBC: 13k. Head CT unremarkable. LP performed by ED physician. HM consulted and patient placed in observation. Patient started empirically on vanc, rocephin, ampicillin, and decadron.        CARDIOLOGY consulted for bradycardia, currently HR is 60 , slowest was 40 sinus christo  No pauses , asymptomatic, in bed since admission states falling asleep frequently  No history of syncope , Not on AVN blockers    8/28/2022  No events on tele  Sinus bradycardia resolved   Tsh borderline low, free t4 normal       Interval History:     Review of Systems   Constitutional:  Positive for activity change.   HENT: Negative.     Eyes: Negative.    Respiratory: Negative.     Cardiovascular: Negative.    Gastrointestinal: Negative.    Endocrine: Negative.    Musculoskeletal: Negative.    Skin: Negative.    Allergic/Immunologic: Negative.    Neurological:  Positive for tremors and light-headedness.   Hematological: Negative.     Psychiatric/Behavioral: Negative.     Objective:     Vital Signs (Most Recent):  Temp: 98.4 °F (36.9 °C) (08/28/22 1156)  Pulse: 80 (08/28/22 1156)  Resp: 18 (08/28/22 1156)  BP: (!) 104/55 (08/28/22 1156)  SpO2: 96 % (08/28/22 1156) Vital Signs (24h Range):  Temp:  [97.7 °F (36.5 °C)-98.4 °F (36.9 °C)] 98.4 °F (36.9 °C)  Pulse:  [50-80] 80  Resp:  [18] 18  SpO2:  [95 %-98 %] 96 %  BP: (104-163)/(55-86) 104/55     Weight: 82.9 kg (182 lb 12.2 oz)  Body mass index is 27.79 kg/m².  No intake or output data in the 24 hours ending 08/28/22 2320     Physical Exam  Vitals reviewed.   Constitutional:       Appearance: Normal appearance. She is normal weight.   HENT:      Head: Normocephalic and atraumatic.      Nose: Nose normal.      Mouth/Throat:      Mouth: Mucous membranes are dry.   Eyes:      Conjunctiva/sclera: Conjunctivae normal.      Pupils: Pupils are equal, round, and reactive to light.   Cardiovascular:      Rate and Rhythm: Normal rate and regular rhythm.      Pulses: Normal pulses.      Heart sounds: Normal heart sounds.   Pulmonary:      Effort: Pulmonary effort is normal.      Breath sounds: Normal breath sounds.   Abdominal:      General: Abdomen is flat. Bowel sounds are normal.      Palpations: Abdomen is soft.   Musculoskeletal:         General: Normal range of motion.      Cervical back: Normal range of motion and neck supple.   Skin:     General: Skin is warm and dry.   Neurological:      General: No focal deficit present.      Mental Status: She is alert and oriented to person, place, and time. Mental status is at baseline.   Psychiatric:         Mood and Affect: Mood normal.         Thought Content: Thought content normal.         Judgment: Judgment normal.       Significant Labs: All pertinent labs within the past 24 hours have been reviewed.    Significant Imaging: I have reviewed all pertinent imaging results/findings within the past 24 hours.        Assessment and Plan:     Brief HPI:      Benign essential HTN  On lisinopril and norvasc    Bradycardia  Asymptomatic   No pauses   No high degree block   No AVN blocking agents   Check TSH   Follow up as OP     8/28/2022  Normal free t4, tsh borderline low   No further cardiac work up , no events on tele       WILL SIGN OFF    VTE Risk Mitigation (From admission, onward)    None          Tod Snowden MD  Cardiology  [facility] ([facility])

## 2022-09-02 LAB
WEST NILE VIRUS CSF INTERP: ABNORMAL
WNV IGG CSF QL: NEGATIVE
WNV IGM CSF QL IA: POSITIVE

## 2023-03-03 ENCOUNTER — HOSPITAL ENCOUNTER (INPATIENT)
Facility: HOSPITAL | Age: 73
LOS: 3 days | Discharge: NURSING, REHAB OR LONG-TERM CARE | DRG: 536 | End: 2023-03-07
Attending: EMERGENCY MEDICINE | Admitting: HOSPITALIST
Payer: MEDICARE

## 2023-03-03 DIAGNOSIS — R07.9 CHEST PAIN: ICD-10-CM

## 2023-03-03 DIAGNOSIS — Z01.818 PRE-OP EVALUATION: ICD-10-CM

## 2023-03-03 DIAGNOSIS — S32.591A CLOSED FRACTURE OF MULTIPLE PUBIC RAMI, RIGHT, INITIAL ENCOUNTER: ICD-10-CM

## 2023-03-03 DIAGNOSIS — S32.82XA MULTIPLE CLOSED FRACTURES OF PELVIS WITHOUT DISRUPTION OF PELVIC RING, INITIAL ENCOUNTER: Primary | ICD-10-CM

## 2023-03-03 LAB
ALBUMIN SERPL BCP-MCNC: 4.1 G/DL (ref 3.5–5.2)
ALP SERPL-CCNC: 49 U/L (ref 55–135)
ALT SERPL W/O P-5'-P-CCNC: 15 U/L (ref 10–44)
ANION GAP SERPL CALC-SCNC: 13 MMOL/L (ref 8–16)
AST SERPL-CCNC: 18 U/L (ref 10–40)
BASOPHILS # BLD AUTO: 0.07 K/UL (ref 0–0.2)
BASOPHILS NFR BLD: 0.5 % (ref 0–1.9)
BILIRUB SERPL-MCNC: 0.4 MG/DL (ref 0.1–1)
BUN SERPL-MCNC: 17 MG/DL (ref 8–23)
CALCIUM SERPL-MCNC: 9.1 MG/DL (ref 8.7–10.5)
CHLORIDE SERPL-SCNC: 108 MMOL/L (ref 95–110)
CO2 SERPL-SCNC: 21 MMOL/L (ref 23–29)
CREAT SERPL-MCNC: 0.7 MG/DL (ref 0.5–1.4)
DIFFERENTIAL METHOD: ABNORMAL
EOSINOPHIL # BLD AUTO: 0.1 K/UL (ref 0–0.5)
EOSINOPHIL NFR BLD: 0.9 % (ref 0–8)
ERYTHROCYTE [DISTWIDTH] IN BLOOD BY AUTOMATED COUNT: 14.4 % (ref 11.5–14.5)
EST. GFR  (NO RACE VARIABLE): >60 ML/MIN/1.73 M^2
GLUCOSE SERPL-MCNC: 103 MG/DL (ref 70–110)
HCT VFR BLD AUTO: 41.5 % (ref 37–48.5)
HGB BLD-MCNC: 13.7 G/DL (ref 12–16)
IMM GRANULOCYTES # BLD AUTO: 0.08 K/UL (ref 0–0.04)
IMM GRANULOCYTES NFR BLD AUTO: 0.6 % (ref 0–0.5)
LYMPHOCYTES # BLD AUTO: 1.9 K/UL (ref 1–4.8)
LYMPHOCYTES NFR BLD: 14.6 % (ref 18–48)
MCH RBC QN AUTO: 29.4 PG (ref 27–31)
MCHC RBC AUTO-ENTMCNC: 33 G/DL (ref 32–36)
MCV RBC AUTO: 89 FL (ref 82–98)
MONOCYTES # BLD AUTO: 0.7 K/UL (ref 0.3–1)
MONOCYTES NFR BLD: 5.3 % (ref 4–15)
NEUTROPHILS # BLD AUTO: 10.2 K/UL (ref 1.8–7.7)
NEUTROPHILS NFR BLD: 78.1 % (ref 38–73)
NRBC BLD-RTO: 0 /100 WBC
PLATELET # BLD AUTO: 215 K/UL (ref 150–450)
PMV BLD AUTO: 9.6 FL (ref 9.2–12.9)
POTASSIUM SERPL-SCNC: 4.7 MMOL/L (ref 3.5–5.1)
PROT SERPL-MCNC: 6.7 G/DL (ref 6–8.4)
RBC # BLD AUTO: 4.66 M/UL (ref 4–5.4)
SODIUM SERPL-SCNC: 142 MMOL/L (ref 136–145)
WBC # BLD AUTO: 13.11 K/UL (ref 3.9–12.7)

## 2023-03-03 PROCEDURE — 85025 COMPLETE CBC W/AUTO DIFF WBC: CPT | Performed by: EMERGENCY MEDICINE

## 2023-03-03 PROCEDURE — 99285 EMERGENCY DEPT VISIT HI MDM: CPT | Mod: 25

## 2023-03-03 PROCEDURE — 93010 ELECTROCARDIOGRAM REPORT: CPT | Mod: ,,, | Performed by: INTERNAL MEDICINE

## 2023-03-03 PROCEDURE — G0378 HOSPITAL OBSERVATION PER HR: HCPCS

## 2023-03-03 PROCEDURE — 96374 THER/PROPH/DIAG INJ IV PUSH: CPT

## 2023-03-03 PROCEDURE — 99284 PR EMERGENCY DEPT VISIT,LEVEL IV: ICD-10-PCS | Mod: 57,,, | Performed by: ORTHOPAEDIC SURGERY

## 2023-03-03 PROCEDURE — 93010 EKG 12-LEAD: ICD-10-PCS | Mod: ,,, | Performed by: INTERNAL MEDICINE

## 2023-03-03 PROCEDURE — 96375 TX/PRO/DX INJ NEW DRUG ADDON: CPT

## 2023-03-03 PROCEDURE — 63600175 PHARM REV CODE 636 W HCPCS: Performed by: EMERGENCY MEDICINE

## 2023-03-03 PROCEDURE — 93005 ELECTROCARDIOGRAM TRACING: CPT

## 2023-03-03 PROCEDURE — 80053 COMPREHEN METABOLIC PANEL: CPT | Performed by: EMERGENCY MEDICINE

## 2023-03-03 PROCEDURE — 27197 CLSD TX PELVIC RING FX: CPT | Mod: ,,, | Performed by: ORTHOPAEDIC SURGERY

## 2023-03-03 PROCEDURE — 99284 EMERGENCY DEPT VISIT MOD MDM: CPT | Mod: 57,,, | Performed by: ORTHOPAEDIC SURGERY

## 2023-03-03 PROCEDURE — 27197 PR CLOSED RX PELVIC RING FX/SUBLUX W/O MANIPULATION: ICD-10-PCS | Mod: ,,, | Performed by: ORTHOPAEDIC SURGERY

## 2023-03-03 RX ORDER — SIMETHICONE 80 MG
1 TABLET,CHEWABLE ORAL 4 TIMES DAILY PRN
Status: DISCONTINUED | OUTPATIENT
Start: 2023-03-03 | End: 2023-03-07 | Stop reason: HOSPADM

## 2023-03-03 RX ORDER — IBUPROFEN 200 MG
16 TABLET ORAL
Status: DISCONTINUED | OUTPATIENT
Start: 2023-03-03 | End: 2023-03-03

## 2023-03-03 RX ORDER — ONDANSETRON 2 MG/ML
4 INJECTION INTRAMUSCULAR; INTRAVENOUS
Status: COMPLETED | OUTPATIENT
Start: 2023-03-03 | End: 2023-03-03

## 2023-03-03 RX ORDER — HYDROCODONE BITARTRATE AND ACETAMINOPHEN 5; 325 MG/1; MG/1
1 TABLET ORAL EVERY 6 HOURS PRN
Status: DISCONTINUED | OUTPATIENT
Start: 2023-03-03 | End: 2023-03-06

## 2023-03-03 RX ORDER — SODIUM CHLORIDE 0.9 % (FLUSH) 0.9 %
3 SYRINGE (ML) INJECTION EVERY 12 HOURS PRN
Status: DISCONTINUED | OUTPATIENT
Start: 2023-03-03 | End: 2023-03-07 | Stop reason: HOSPADM

## 2023-03-03 RX ORDER — DEXTROSE 40 %
15 GEL (GRAM) ORAL
Status: DISCONTINUED | OUTPATIENT
Start: 2023-03-03 | End: 2023-03-07 | Stop reason: HOSPADM

## 2023-03-03 RX ORDER — NALOXONE HCL 0.4 MG/ML
0.02 VIAL (ML) INJECTION
Status: DISCONTINUED | OUTPATIENT
Start: 2023-03-03 | End: 2023-03-07 | Stop reason: HOSPADM

## 2023-03-03 RX ORDER — MORPHINE SULFATE 4 MG/ML
4 INJECTION, SOLUTION INTRAMUSCULAR; INTRAVENOUS
Status: COMPLETED | OUTPATIENT
Start: 2023-03-03 | End: 2023-03-03

## 2023-03-03 RX ORDER — MORPHINE SULFATE 4 MG/ML
2 INJECTION, SOLUTION INTRAMUSCULAR; INTRAVENOUS EVERY 4 HOURS PRN
Status: DISCONTINUED | OUTPATIENT
Start: 2023-03-03 | End: 2023-03-06

## 2023-03-03 RX ORDER — TALC
6 POWDER (GRAM) TOPICAL NIGHTLY PRN
Status: DISCONTINUED | OUTPATIENT
Start: 2023-03-03 | End: 2023-03-07 | Stop reason: HOSPADM

## 2023-03-03 RX ORDER — ACETAMINOPHEN 650 MG/1
650 SUPPOSITORY RECTAL EVERY 4 HOURS PRN
Status: DISCONTINUED | OUTPATIENT
Start: 2023-03-03 | End: 2023-03-07 | Stop reason: HOSPADM

## 2023-03-03 RX ORDER — ENOXAPARIN SODIUM 100 MG/ML
40 INJECTION SUBCUTANEOUS EVERY 24 HOURS
Status: DISCONTINUED | OUTPATIENT
Start: 2023-03-04 | End: 2023-03-07 | Stop reason: HOSPADM

## 2023-03-03 RX ORDER — MAG HYDROX/ALUMINUM HYD/SIMETH 200-200-20
30 SUSPENSION, ORAL (FINAL DOSE FORM) ORAL 4 TIMES DAILY PRN
Status: DISCONTINUED | OUTPATIENT
Start: 2023-03-03 | End: 2023-03-07 | Stop reason: HOSPADM

## 2023-03-03 RX ORDER — POLYETHYLENE GLYCOL 3350 17 G/17G
17 POWDER, FOR SOLUTION ORAL DAILY PRN
Status: DISCONTINUED | OUTPATIENT
Start: 2023-03-03 | End: 2023-03-07

## 2023-03-03 RX ORDER — ONDANSETRON 2 MG/ML
4 INJECTION INTRAMUSCULAR; INTRAVENOUS EVERY 8 HOURS PRN
Status: DISCONTINUED | OUTPATIENT
Start: 2023-03-03 | End: 2023-03-07 | Stop reason: HOSPADM

## 2023-03-03 RX ORDER — ACETAMINOPHEN 325 MG/1
650 TABLET ORAL EVERY 8 HOURS PRN
Status: DISCONTINUED | OUTPATIENT
Start: 2023-03-03 | End: 2023-03-07 | Stop reason: HOSPADM

## 2023-03-03 RX ORDER — DEXTROSE 40 %
30 GEL (GRAM) ORAL
Status: DISCONTINUED | OUTPATIENT
Start: 2023-03-03 | End: 2023-03-07 | Stop reason: HOSPADM

## 2023-03-03 RX ORDER — RALOXIFENE HYDROCHLORIDE 60 MG/1
60 TABLET, FILM COATED ORAL DAILY
COMMUNITY
Start: 2022-12-05

## 2023-03-03 RX ORDER — PROMETHAZINE HYDROCHLORIDE 25 MG/1
25 TABLET ORAL EVERY 6 HOURS PRN
Status: DISCONTINUED | OUTPATIENT
Start: 2023-03-03 | End: 2023-03-07 | Stop reason: HOSPADM

## 2023-03-03 RX ORDER — GLUCAGON 1 MG
1 KIT INJECTION
Status: DISCONTINUED | OUTPATIENT
Start: 2023-03-03 | End: 2023-03-07 | Stop reason: HOSPADM

## 2023-03-03 RX ORDER — IPRATROPIUM BROMIDE AND ALBUTEROL SULFATE 2.5; .5 MG/3ML; MG/3ML
3 SOLUTION RESPIRATORY (INHALATION) EVERY 4 HOURS PRN
Status: DISCONTINUED | OUTPATIENT
Start: 2023-03-03 | End: 2023-03-05

## 2023-03-03 RX ORDER — IBUPROFEN 200 MG
24 TABLET ORAL
Status: DISCONTINUED | OUTPATIENT
Start: 2023-03-03 | End: 2023-03-03

## 2023-03-03 RX ADMIN — ONDANSETRON 4 MG: 2 INJECTION INTRAMUSCULAR; INTRAVENOUS at 07:03

## 2023-03-03 RX ADMIN — MORPHINE SULFATE 4 MG: 4 INJECTION INTRAVENOUS at 07:03

## 2023-03-03 NOTE — ED PROVIDER NOTES
SCRIBE #1 NOTE: I, Beverly Cesar, am scribing for, and in the presence of, Chari Avila DO. I have scribed the entire note.       History     Chief Complaint   Patient presents with    Fall     Right hip pain with shortening and rotation after fall     Review of patient's allergies indicates:  No Known Allergies      History of Present Illness     HPI    3/3/2023, 5:36 PM  History obtained from the patient      History of Present Illness: Fang Quiles is a 72 y.o. female patient who presents to the Emergency Department for evaluation after a fall. Pt states she tripped over a small fan and tripped landing on R hip. Pt denies hitting her head. Pt states pain worsens with movement and improves with the mobilization.  Received fentanyl EN route.  Patient denies any back pain, neck pain, CP, SOB, abdominal pain, N/V/D, and all other sxs at this time.  No further complaints or concerns at this time.       Arrival mode: Ambulance service    PCP: Farida Patel MD        Past Medical History:  Past Medical History:   Diagnosis Date    Hypercholesteremia     Osteoporosis        Past Surgical History:  Past Surgical History:   Procedure Laterality Date     SECTION      FRACTURE SURGERY           Family History:  History reviewed. No pertinent family history.    Social History:  Social History     Tobacco Use    Smoking status: Never    Smokeless tobacco: Never   Substance and Sexual Activity    Alcohol use: Never    Drug use: Not on file    Sexual activity: Not on file        Review of Systems     Review of Systems   Respiratory:  Negative for shortness of breath.    Cardiovascular:  Negative for chest pain.   Gastrointestinal:  Negative for abdominal pain, diarrhea, nausea and vomiting.   Musculoskeletal:  Negative for back pain and neck pain.        (+) R hip pain    Physical Exam     Initial Vitals [23 1651]   BP Pulse Resp Temp SpO2   (!) 158/75 69 18 97.8 °F (36.6 °C) 100 %      MAP       --         "  Physical Exam  Nursing Notes and Vital Signs Reviewed.  Constitutional: Patient is in no acute distress. Well-developed and well-nourished.  Head: Atraumatic. Normocephalic.  Eyes: PERRL. EOM intact. Conjunctivae are not pale. No scleral icterus.  ENT: Mucous membranes are moist.   Neck:  No midline tenderness, step-offs, or deformities  Cardiovascular: Regular rate. Regular rhythm.   Pulmonary/Chest: No respiratory distress. Clear to auscultation bilaterally. No wheezing or rales.  Abdominal: Soft and non-distended.  There is no tenderness.  No rebound, guarding, or rigidity.  Back: No tenderness, step-offs, or deformities. Musculoskeletal: Moves all extremities. No edema. Tenderness to lateral hip area and R inguinal area. DP and PT pulses 2+ bilaterally. Good sensation and capillarity refill.   Skin: Warm and dry.  Neurological:  Alert, awake, and appropriate.  Normal speech.  No acute focal neurological deficits are appreciated.  Psychiatric: Normal affect. Good eye contact. Appropriate in content.     ED Course   Procedures  ED Vital Signs:  Vitals:    03/03/23 1651 03/03/23 1831 03/03/23 1909 03/03/23 1937   BP: (!) 158/75 (!) 163/87  (!) 155/65   Pulse: 69 69  83   Resp: 18 18 16 16   Temp: 97.8 °F (36.6 °C)      TempSrc: Oral      SpO2: 100% 100%  96%   Height:        03/03/23 2117 03/03/23 2215   BP: (!) 140/77    Pulse: 89    Resp: 16    Temp: 99.9 °F (37.7 °C)    TempSrc: Oral    SpO2: 98%    Height:  5' 7" (1.702 m)       Abnormal Lab Results:  Labs Reviewed   CBC W/ AUTO DIFFERENTIAL - Abnormal; Notable for the following components:       Result Value    WBC 13.11 (*)     Immature Granulocytes 0.6 (*)     Gran # (ANC) 10.2 (*)     Immature Grans (Abs) 0.08 (*)     Gran % 78.1 (*)     Lymph % 14.6 (*)     All other components within normal limits   COMPREHENSIVE METABOLIC PANEL - Abnormal; Notable for the following components:    CO2 21 (*)     Alkaline Phosphatase 49 (*)     All other components " within normal limits        All Lab Results:  Results for orders placed or performed during the hospital encounter of 03/03/23   CBC auto differential   Result Value Ref Range    WBC 13.11 (H) 3.90 - 12.70 K/uL    RBC 4.66 4.00 - 5.40 M/uL    Hemoglobin 13.7 12.0 - 16.0 g/dL    Hematocrit 41.5 37.0 - 48.5 %    MCV 89 82 - 98 fL    MCH 29.4 27.0 - 31.0 pg    MCHC 33.0 32.0 - 36.0 g/dL    RDW 14.4 11.5 - 14.5 %    Platelets 215 150 - 450 K/uL    MPV 9.6 9.2 - 12.9 fL    Immature Granulocytes 0.6 (H) 0.0 - 0.5 %    Gran # (ANC) 10.2 (H) 1.8 - 7.7 K/uL    Immature Grans (Abs) 0.08 (H) 0.00 - 0.04 K/uL    Lymph # 1.9 1.0 - 4.8 K/uL    Mono # 0.7 0.3 - 1.0 K/uL    Eos # 0.1 0.0 - 0.5 K/uL    Baso # 0.07 0.00 - 0.20 K/uL    nRBC 0 0 /100 WBC    Gran % 78.1 (H) 38.0 - 73.0 %    Lymph % 14.6 (L) 18.0 - 48.0 %    Mono % 5.3 4.0 - 15.0 %    Eosinophil % 0.9 0.0 - 8.0 %    Basophil % 0.5 0.0 - 1.9 %    Differential Method Automated    Comprehensive metabolic panel   Result Value Ref Range    Sodium 142 136 - 145 mmol/L    Potassium 4.7 3.5 - 5.1 mmol/L    Chloride 108 95 - 110 mmol/L    CO2 21 (L) 23 - 29 mmol/L    Glucose 103 70 - 110 mg/dL    BUN 17 8 - 23 mg/dL    Creatinine 0.7 0.5 - 1.4 mg/dL    Calcium 9.1 8.7 - 10.5 mg/dL    Total Protein 6.7 6.0 - 8.4 g/dL    Albumin 4.1 3.5 - 5.2 g/dL    Total Bilirubin 0.4 0.1 - 1.0 mg/dL    Alkaline Phosphatase 49 (L) 55 - 135 U/L    AST 18 10 - 40 U/L    ALT 15 10 - 44 U/L    Anion Gap 13 8 - 16 mmol/L    eGFR >60 >60 mL/min/1.73 m^2         Imaging Results:  Imaging Results              X-Ray Pelvis AP Inlet And Outlet (Final result)  Result time 03/03/23 20:34:31      Final result by Kenney Guadalupe MD (03/03/23 20:34:31)                   Impression:      As above      Electronically signed by: Kenney Guadalupe  Date:    03/03/2023  Time:    20:34               Narrative:    EXAMINATION:  XR PELVIS AP INLET AND OUTLET    CLINICAL HISTORY:  XR PELVIS AP INLET AND  OUTLET    COMPARISON:  None    FINDINGS:  Multiple radiographic views  were obtained.    Fracture of the right superior pubic rami.  Suggestion of a Arriaga catheter.  See dedicated CT                                       CT Pelvis Without Contrast (Final result)  Result time 03/03/23 18:51:01      Final result by Kenney Guadalupe MD (03/03/23 18:51:01)                   Impression:      As above    All CT scans at this facility are performed  using dose modulation techniques as appropriate to performed exam including the following:  automated exposure control; adjustment of mA and/or kV according to the patients size (this includes techniques or standardized protocols for targeted exams where dose is matched to indication/reason for exam: i.e. extremities or head);  iterative reconstruction technique.      Electronically signed by: Kenney Guadalupe  Date:    03/03/2023  Time:    18:51               Narrative:    EXAMINATION:  CT PELVIS WITHOUT CONTRAST    CLINICAL HISTORY:  Pelvis pain, known fracture on x-ray    TECHNIQUE:  CT of the pelvis without    COMPARISON:  None    FINDINGS:  Comminuted fracture of the right superior pubic rami.  Fracture of the inferior pubic rami.  There is  associated small hematoma the anterior soft tissues of the anterior inferior abdomen wall.  Colonic diverticulosis.                                       X-Ray Hip 2 or 3 views Right (with Pelvis when performed) (Final result)  Result time 03/03/23 18:13:01      Final result by Kenney Guadalupe MD (03/03/23 18:13:01)                   Impression:      As above      Electronically signed by: Kenney Guadalupe  Date:    03/03/2023  Time:    18:13               Narrative:    EXAMINATION:  XR HIP WITH PELVIS WHEN PERFORMED, 2 OR 3  VIEWS RIGHT    CLINICAL HISTORY:  XR HIP WITH PELVIS WHEN PERFORMED, 2 OR 3  VIEWS RIGHT    COMPARISON:  None    FINDINGS:  Multiple radiographic views  were obtained.    Irregularity of the superior rami of the right-side  consistent with fracture.  No femoroacetabular fracture.                                       The EKG was ordered, reviewed, and independently interpreted by the ED provider.  Interpretation time: 17;24  Rate: 69 BPM  Rhythm:  Sinus rhythm with occasional Premature ventricular complexes  Interpretation: No acute ST or T wave abnormalities. No STEMI.             The Emergency Provider reviewed the vital signs and test results, which are outlined above.     ED Discussion     7:48 PM: Discussed pt's case with Dr. Sigala (Orthopedic surgery) who recommends  admission to hospital medicine and see if she can mobilize in the morning with a walker and PT. He also states he will order  extra films and put the  full plan in his note.    7:52 PM: Discussed case with Matt Davis NP  ([facility]). Dr. Davis agrees with current care and management of pt and accepts admission.   Admitting Service: Hospital medicine   Admitting Physician: Dr. Davis  Admit to: Obs    7:53 PM: Re-evaluated pt. I have discussed test results, shared treatment plan, and the need for admission with patient and family at bedside. Pt and family express understanding at this time and agree with all information. All questions answered. Pt and family have no further questions or concerns at this time. Pt is ready for admit.        ED Course as of 03/03/23 2225   Fri Mar 03, 2023   1729 Sinus rhythm with occasional PVCs.  Rate 69.  MD interval 188.  QRS 84.  .  No ST or T-wave changes.  Normal axis.   [NF]   1903 72-year-old female with a history of osteoporosis presents with right hip pain after a nonsyncopal fall.  Tenderness to palpation in her left inguinal region.  Xray: Irregularity of the superior rami of the right-side consistent with fracture.  No femoroacetabular fracture.  CT: Comminuted fracture of the right superior pubic rami.  Fracture of the inferior pubic rami.  There is  associated small hematoma the anterior soft  tissues of the anterior inferior abdomen wall. [NF]      ED Course User Index  [NF] Chari Avila DO     Medical Decision Making:   Clinical Tests:   Lab Tests: Ordered and Reviewed  Radiological Study: Ordered and Reviewed  Medical Tests: Ordered and Reviewed  ED Management:  72-year-old female with a history of osteoporosis presents with right hip pain after a nonsyncopal fall.  Tenderness to palpation in her left inguinal region.  EKG shows sinus rhythm PVCs otherwise normal.  X-ray shows right superior pubic rami fracture.  CT confirms fracture and also shows inferior pubic rami fracture with a hematoma and a sacral ala fracture.  CBC and CMP are normal.  Pain is well controlled with IV morphine.  Xray: Irregularity of the superior rami of the right-side consistent with fracture.  No femoroacetabular fracture.CT: Comminuted fracture of the right superior pubic rami.  Fracture of the inferior pubic rami.  There is  associated small hematoma the anterior soft tissues of the anterior inferior abdomen wall.    Other:   I have discussed this case with another health care provider.       <> Summary of the Discussion: Orthopedic surgery recommended admission to Medicine for PT, likely nonsurgical.  Hospital medicine admitted patient      ED Medication(s):  Medications   sodium chloride 0.9% flush 3 mL (has no administration in time range)   albuterol-ipratropium 2.5 mg-0.5 mg/3 mL nebulizer solution 3 mL (has no administration in time range)   melatonin tablet 6 mg (has no administration in time range)   ondansetron injection 4 mg (has no administration in time range)   promethazine tablet 25 mg (has no administration in time range)   polyethylene glycol packet 17 g (has no administration in time range)   acetaminophen tablet 650 mg (has no administration in time range)   simethicone chewable tablet 80 mg (has no administration in time range)   aluminum-magnesium hydroxide-simethicone 200-200-20 mg/5 mL suspension  30 mL (has no administration in time range)   acetaminophen suppository 650 mg (has no administration in time range)   HYDROcodone-acetaminophen 5-325 mg per tablet 1 tablet (has no administration in time range)   morphine injection 2 mg (has no administration in time range)   naloxone 0.4 mg/mL injection 0.02 mg (has no administration in time range)   glucagon (human recombinant) injection 1 mg (has no administration in time range)   enoxaparin injection 40 mg (has no administration in time range)   dextrose 40 % gel 15,000 mg (has no administration in time range)   dextrose 40 % gel 30,000 mg (has no administration in time range)   dextrose 10% bolus 125 mL 125 mL (has no administration in time range)   dextrose 10% bolus 250 mL 250 mL (has no administration in time range)   morphine injection 4 mg (4 mg Intravenous Given 3/3/23 1909)   ondansetron injection 4 mg (4 mg Intravenous Given 3/3/23 1909)       Current Discharge Medication List                  Scribe Attestation:   Scribe #1: I performed the above scribed service and the documentation accurately describes the services I performed. I attest to the accuracy of the note.     Attending:   Physician Attestation Statement for Scribe #1: I, Chari Avila DO, personally performed the services described in this documentation, as scribed by Beverly Cesar, in my presence, and it is both accurate and complete.           Clinical Impression       ICD-10-CM ICD-9-CM   1. Multiple closed fractures of pelvis without disruption of pelvic ring, initial encounter  S32.82XA 808.44   2. Pre-op evaluation  Z01.818 V72.84     R07.9 786.50       Disposition:   Disposition: Placed in Observation  Condition: Fair       Chari Avila DO  03/03/23 2225       Chari Avila DO  03/03/23 2225

## 2023-03-04 PROBLEM — S32.591A CLOSED FRACTURE OF MULTIPLE PUBIC RAMI, RIGHT, INITIAL ENCOUNTER: Status: ACTIVE | Noted: 2023-03-04

## 2023-03-04 PROCEDURE — 25000003 PHARM REV CODE 250: Performed by: NURSE PRACTITIONER

## 2023-03-04 PROCEDURE — 94761 N-INVAS EAR/PLS OXIMETRY MLT: CPT

## 2023-03-04 PROCEDURE — 97530 THERAPEUTIC ACTIVITIES: CPT

## 2023-03-04 PROCEDURE — 97166 OT EVAL MOD COMPLEX 45 MIN: CPT

## 2023-03-04 PROCEDURE — 11000001 HC ACUTE MED/SURG PRIVATE ROOM

## 2023-03-04 PROCEDURE — 63600175 PHARM REV CODE 636 W HCPCS: Performed by: NURSE PRACTITIONER

## 2023-03-04 PROCEDURE — 99900035 HC TECH TIME PER 15 MIN (STAT)

## 2023-03-04 PROCEDURE — 96372 THER/PROPH/DIAG INJ SC/IM: CPT

## 2023-03-04 PROCEDURE — 97162 PT EVAL MOD COMPLEX 30 MIN: CPT

## 2023-03-04 RX ORDER — ATORVASTATIN CALCIUM 40 MG/1
40 TABLET, FILM COATED ORAL NIGHTLY
Status: DISCONTINUED | OUTPATIENT
Start: 2023-03-04 | End: 2023-03-06

## 2023-03-04 RX ORDER — RALOXIFENE HYDROCHLORIDE 60 MG/1
60 TABLET, FILM COATED ORAL DAILY
Status: DISCONTINUED | OUTPATIENT
Start: 2023-03-04 | End: 2023-03-07 | Stop reason: HOSPADM

## 2023-03-04 RX ADMIN — MORPHINE SULFATE 2 MG: 4 INJECTION INTRAVENOUS at 06:03

## 2023-03-04 RX ADMIN — MORPHINE SULFATE 2 MG: 4 INJECTION INTRAVENOUS at 04:03

## 2023-03-04 RX ADMIN — HYDROCODONE BITARTRATE AND ACETAMINOPHEN 1 TABLET: 5; 325 TABLET ORAL at 02:03

## 2023-03-04 RX ADMIN — HYDROCODONE BITARTRATE AND ACETAMINOPHEN 1 TABLET: 5; 325 TABLET ORAL at 09:03

## 2023-03-04 RX ADMIN — ENOXAPARIN SODIUM 40 MG: 40 INJECTION SUBCUTANEOUS at 04:03

## 2023-03-04 RX ADMIN — RALOXIFENE 60 MG: 60 TABLET ORAL at 09:03

## 2023-03-04 RX ADMIN — MORPHINE SULFATE 2 MG: 4 INJECTION INTRAVENOUS at 10:03

## 2023-03-04 RX ADMIN — ATORVASTATIN CALCIUM 40 MG: 40 TABLET, FILM COATED ORAL at 09:03

## 2023-03-04 NOTE — SUBJECTIVE & OBJECTIVE
Interval History: Patient denies any current issues. States pain is controlled.      Review of Systems   Constitutional:  Negative for fatigue and fever.   HENT:  Negative for sinus pressure.    Eyes:  Negative for visual disturbance.   Respiratory:  Negative for shortness of breath.    Cardiovascular:  Negative for chest pain.   Gastrointestinal:  Negative for nausea and vomiting.   Genitourinary:  Negative for difficulty urinating.   Musculoskeletal:  Positive for arthralgias and gait problem. Negative for back pain.   Skin:  Negative for rash.   Neurological:  Negative for headaches.   Psychiatric/Behavioral:  Negative for confusion.    Objective:     Vital Signs (Most Recent):  Temp: 98.9 °F (37.2 °C) (03/04/23 0728)  Pulse: 86 (03/04/23 0844)  Resp: 18 (03/04/23 1025)  BP: (!) 107/58 (03/04/23 0728)  SpO2: (!) 93 % (03/04/23 0844)   Vital Signs (24h Range):  Temp:  [97.8 °F (36.6 °C)-99.9 °F (37.7 °C)] 98.9 °F (37.2 °C)  Pulse:  [69-89] 86  Resp:  [16-18] 18  SpO2:  [93 %-100 %] 93 %  BP: (104-163)/(56-87) 107/58     Weight: 80.7 kg (177 lb 14.6 oz)  Body mass index is 27.86 kg/m².    Intake/Output Summary (Last 24 hours) at 3/4/2023 1056  Last data filed at 3/3/2023 2105  Gross per 24 hour   Intake --   Output 400 ml   Net -400 ml      Physical Exam  Constitutional:       General: She is not in acute distress.     Appearance: She is well-developed. She is not diaphoretic.   HENT:      Head: Normocephalic and atraumatic.   Eyes:      Pupils: Pupils are equal, round, and reactive to light.   Cardiovascular:      Rate and Rhythm: Normal rate and regular rhythm.      Heart sounds: Normal heart sounds. No murmur heard.    No friction rub. No gallop.   Pulmonary:      Effort: Pulmonary effort is normal. No respiratory distress.      Breath sounds: Normal breath sounds. No stridor. No wheezing or rales.   Abdominal:      General: Bowel sounds are normal. There is no distension.      Palpations: Abdomen is soft. There  is no mass.      Tenderness: There is no abdominal tenderness. There is no guarding.   Musculoskeletal:      Comments: ROM limited   Skin:     General: Skin is warm.      Findings: No erythema.   Neurological:      Mental Status: She is alert and oriented to person, place, and time.       Significant Labs: All pertinent labs within the past 24 hours have been reviewed.    Significant Imaging: I have reviewed all pertinent imaging results/findings within the past 24 hours.

## 2023-03-04 NOTE — PLAN OF CARE
OT eval completed. Mod A for sup>sit, t/fs with RW, and 3ft stepping with RW and RLE PWB. Min A for forward scoot to EOB.   Recommends rehab.

## 2023-03-04 NOTE — PROGRESS NOTES
[facility] Medicine  Progress Note    Patient Name: Fang Quiles  MRN: [id number]  Patient Class: IP- Inpatient   Admission Date: 3/3/2023  Length of Stay: 0 days  Attending Physician: Viral Rodriguez MD  Primary Care Provider: Farida Patel MD        Subjective:     Principal Problem:Closed fracture of multiple pubic rami, right, initial encounter        HPI:  Fang Quiles is a 72 y.o. female with a PMH  has a past medical history of Hypercholesteremia and Osteoporosis.  Presented to the ER for evaluation of right hip pain after having an mechanical fall in her house prior to arrival.  Patient reports landing on her buttock/right hip and states she felt immediate pain.  Patient was unable to get self up off the floor or bear weight on her right lower extremity.  EMS was called to bring patient to hospital for evaluation.  Denies hitting head or losing consciousness.  Initially rated pain 10/10.  Currently rates pain 2/10.  Alleviating factors include pain medicine (fentanyl) given in in route to ED by EMS personnel as well as keeping right lower extremity still .  Aggravating factors include movement palpation over right hip.  denies any numbness/tingling to her right lower extremity.  Denies any other symptoms at this time.ER workup revealed leukocytosis of 13.11 likely reactive to acute fracture seen on imaging.  CT imaging of the pelvis revealed: [Comminuted fracture of the right superior pubic rami.  Fracture of the inferior pubic rami. There is  associated small hematoma the anterior soft tissues of the anterior inferior abdomen wall.  Colonic diverticulosis].  Orthopedic provider on-call consult.  Injury non operative.  Recommended Hospital Medicine to admit for pain control and PT OT eval in a.m..  Patient family at bedside are in agreement with treatment plan.  Patient will be placed in observation status.  PCP: Farida Patel        Overview/Hospital Course:  3/4: cont norco and morphine prn  pain. Pt/ot consulted on case. Awaiting recs. Possible dc tomorrow if pain controlled. Fracture non operable per ortho.       Interval History: Patient denies any current issues. States pain is controlled.      Review of Systems   Constitutional:  Negative for fatigue and fever.   HENT:  Negative for sinus pressure.    Eyes:  Negative for visual disturbance.   Respiratory:  Negative for shortness of breath.    Cardiovascular:  Negative for chest pain.   Gastrointestinal:  Negative for nausea and vomiting.   Genitourinary:  Negative for difficulty urinating.   Musculoskeletal:  Positive for arthralgias and gait problem. Negative for back pain.   Skin:  Negative for rash.   Neurological:  Negative for headaches.   Psychiatric/Behavioral:  Negative for confusion.    Objective:     Vital Signs (Most Recent):  Temp: 98.9 °F (37.2 °C) (03/04/23 0728)  Pulse: 86 (03/04/23 0844)  Resp: 18 (03/04/23 1025)  BP: (!) 107/58 (03/04/23 0728)  SpO2: (!) 93 % (03/04/23 0844)   Vital Signs (24h Range):  Temp:  [97.8 °F (36.6 °C)-99.9 °F (37.7 °C)] 98.9 °F (37.2 °C)  Pulse:  [69-89] 86  Resp:  [16-18] 18  SpO2:  [93 %-100 %] 93 %  BP: (104-163)/(56-87) 107/58     Weight: 80.7 kg (177 lb 14.6 oz)  Body mass index is 27.86 kg/m².    Intake/Output Summary (Last 24 hours) at 3/4/2023 1056  Last data filed at 3/3/2023 2105  Gross per 24 hour   Intake --   Output 400 ml   Net -400 ml      Physical Exam  Constitutional:       General: She is not in acute distress.     Appearance: She is well-developed. She is not diaphoretic.   HENT:      Head: Normocephalic and atraumatic.   Eyes:      Pupils: Pupils are equal, round, and reactive to light.   Cardiovascular:      Rate and Rhythm: Normal rate and regular rhythm.      Heart sounds: Normal heart sounds. No murmur heard.    No friction rub. No gallop.   Pulmonary:      Effort: Pulmonary effort is normal. No respiratory distress.      Breath sounds: Normal breath sounds. No stridor. No wheezing  or rales.   Abdominal:      General: Bowel sounds are normal. There is no distension.      Palpations: Abdomen is soft. There is no mass.      Tenderness: There is no abdominal tenderness. There is no guarding.   Musculoskeletal:      Comments: ROM limited   Skin:     General: Skin is warm.      Findings: No erythema.   Neurological:      Mental Status: She is alert and oriented to person, place, and time.       Significant Labs: All pertinent labs within the past 24 hours have been reviewed.    Significant Imaging: I have reviewed all pertinent imaging results/findings within the past 24 hours.      Assessment/Plan:      * Closed fracture of multiple pubic rami, right, initial encounter  CT pelvis revealed: Comminuted fracture of the right superior pubic rami.  Fracture of the inferior pubic rami. There is  associated small hematoma the anterior soft tissues of the anterior inferior abdomen wall.  Colonic diverticulosis.    Ortho consulted and stated injury is non operative. NVS grossly intact.  Plan:  -analgesics prn  -PT/OT eval in am  -fall precautions    Awaiting pt/ot recs  Possible placement needed    Gastroesophageal reflux disease without esophagitis  Chronic. Stable. Currently asymptomatic. Home medications include PPI/Antacids as needed.  Plan:  -Continue PPI/Antacids as needed         Hyperlipidemia  Patient is chronically on statin.will not continue for now. Last Lipid Panel:   Lab Results   Component Value Date    CHOL 174 12/03/2021    HDL 48 12/03/2021    LDLCALC 97 12/03/2021    TRIG 146 12/03/2021    CHOLHDL 3.6 12/03/2021     Plan:  -Continue home medication  -low fat/low calorie diet        Osteoporosis  Currently taking raloxifene 60mg daily. Reports compliance with home medications.  Plan:  -continue raloxifene 60mg qd.    Benign essential HTN  Currently normotensive. BP usually well controlled per patient with diet. Not currently on home medications.  Plan:  -Optimize pain control   -Monitor  BP  -Low salt/cardiac diet when not NPO  -IV hydralazine prn for SBP>160 or DBP>90             VTE Risk Mitigation (From admission, onward)         Ordered     enoxaparin injection 40 mg  Daily         03/03/23 2029     IP VTE HIGH RISK PATIENT  Once         03/03/23 2029     Place sequential compression device  Until discontinued         03/03/23 2029                Discharge Planning   ELIUD:      Code Status: Full Code   Is the patient medically ready for discharge?:     Reason for patient still in hospital (select all that apply): Patient trending condition                     Viral Rodriguez MD  Department of Hospital Medicine   [facility]

## 2023-03-04 NOTE — PLAN OF CARE
Pt Awake, Alert, and oriented to Person, Place, Time  , remains free from falls/injuries this shift. Safety precautions maintained. Pain managed with pain medication and elevation. No s/s of acute distress noted. Continue with plan of care. Chart check complete.

## 2023-03-04 NOTE — CONSULTS
Consulted by ED: Chari Avila DO    Performed by: Kenneth Sigala MD      History           Chief Complaint   Patient presents with    Fall       Right hip pain with shortening and rotation after fall      Review of patient's allergies indicates:  No Known Allergies       History of Present Illness      HPI     3/3/2023  History obtained from the patient, review of the chart and records, and from other treating physicians                  History of Present Illness:   This is a 72-year-old female community ambulator with no previous history of hip pain who fell earlier this afternoon after she was walking in her own house and tripped over a fan that was on the floor.  She was almost able to catch herself but ultimately unsuccessful and she fell directly onto her right side.  She did not get up and ambulate after that.  Her  was there and she was transported here for further evaluation.  She denies any numbness or tingling but complains of pain on the lateral and front of her pelvis.  She denies any posterior pain.  She denies any numbness tingling fevers or chills.  She denies any bowel or bladder problems.  She has been urinating normally since the injury.    Fang Quiles is a 72 y.o. female patient who presents to the Emergency Department for evaluation after a fall. Pt states she tripped over a small fan and tripped landing on R hip. Pt denies hitting her head. Pt states pain worsens with movement.  Symptoms are constant and moderate in severity. No mitigating or exacerbating factors reported.  Patient denies any back pain, neck pain, CP, SOB, abdominal pain, N/V/D, and all other sxs at this time.  No further complaints or concerns at this time.      Arrival mode: Ambulance service     PCP: Farida Patel MD         Past Medical History:  No past medical history on file.     Past Surgical History:  No past surgical history on file.       Family History:  No family history on file.     Social  History:  Social History           Tobacco Use    Smoking status: Not on file    Smokeless tobacco: Not on file   Substance and Sexual Activity    Alcohol use: Not on file    Drug use: Not on file    Sexual activity: Not on file          Review of Systems      Review of Systems   Respiratory:  Negative for shortness of breath.    Cardiovascular:  Negative for chest pain.   Gastrointestinal:  Negative for abdominal pain, diarrhea, nausea and vomiting.   Musculoskeletal:  Negative for back pain and neck pain.        (+) R hip pain    Physical Exam             Initial Vitals [03/03/23 1651]   BP Pulse Resp Temp SpO2   (!) 158/75 69 18 97.8 °F (36.6 °C) 100 %       MAP           --              Physical Exam  Nursing Notes and Vital Signs Reviewed.  Constitutional: Patient is in no acute distress. Well-developed and well-nourished.  Head: Atraumatic. Normocephalic.  Eyes: PERRL. EOM intact. Conjunctivae are not pale. No scleral icterus.  ENT: Mucous membranes are moist. Oropharynx is clear and symmetric.    Neck: Supple. Full ROM. No lymphadenopathy.  Cardiovascular: Regular rate. Regular rhythm. No murmurs, rubs, or gallops. Distal pulses are 2+ and symmetric.  Pulmonary/Chest: No respiratory distress. Clear to auscultation bilaterally. No wheezing or rales.  Abdominal: Soft and non-distended.  There is no tenderness.  No rebound, guarding, or rigidity. Good bowel sounds.  Genitourinary: No CVA tenderness    Musculoskeletal:   Bilateral UE: Range of motion within normal limits and painless. Intact motor and sensation. Good perfusion. No tenderness, gross deformity, gross instability, or skin lesions.  LLE: Range of motion within normal limits and painless. Intact motor and sensation. Good perfusion. No tenderness, gross deformity, gross instability, or skin lesions.  Pelvis: right anterior pain with compression. No gross instability  RLE:  She localizes her pain anteriorly and laterally over her hip.  She does not have  any pain with logroll.  I was able to passively flex her to 90° with internal and external rotation of her hip that were within normal limits that she had only mild pain she was unable to actively perform hip flexion.  She had vague tenderness to palpation over the anterior pubic symphysis.  She had no skin breakdown or bruising that was observed.  Distally she was completely neurovascularly intact with a soft and nontender calf good pulses and good cap refill.  Her knee appeared to have normal range of motion and normal stability without sign of injury as did her ankle.  She denied any lower back pain or posterior pelvis pain or tenderness to palpation    Neurological:  Alert, awake, and appropriate.  Normal speech.  No acute focal neurological deficits are appreciated.  Psychiatric: Normal affect. Good eye contact. Appropriate in content.      ED Course   Procedures  ED Vital Signs:      Vitals:     03/03/23 1651   BP: (!) 158/75   Pulse: 69   Resp: 18   Temp: 97.8 °F (36.6 °C)   TempSrc: Oral   SpO2: 100%         Abnormal Lab Results:  Labs Reviewed   CBC W/ AUTO DIFFERENTIAL   COMPREHENSIVE METABOLIC PANEL         All Lab Results:        Results for orders placed or performed during the hospital encounter of 03/03/23   CBC auto differential   Result Value Ref Range     WBC 13.11 (H) 3.90 - 12.70 K/uL     RBC 4.66 4.00 - 5.40 M/uL     Hemoglobin 13.7 12.0 - 16.0 g/dL     Hematocrit 41.5 37.0 - 48.5 %     MCV 89 82 - 98 fL     MCH 29.4 27.0 - 31.0 pg     MCHC 33.0 32.0 - 36.0 g/dL     RDW 14.4 11.5 - 14.5 %     Platelets 215 150 - 450 K/uL     MPV 9.6 9.2 - 12.9 fL     Immature Granulocytes 0.6 (H) 0.0 - 0.5 %     Gran # (ANC) 10.2 (H) 1.8 - 7.7 K/uL     Immature Grans (Abs) 0.08 (H) 0.00 - 0.04 K/uL     Lymph # 1.9 1.0 - 4.8 K/uL     Mono # 0.7 0.3 - 1.0 K/uL     Eos # 0.1 0.0 - 0.5 K/uL     Baso # 0.07 0.00 - 0.20 K/uL     nRBC 0 0 /100 WBC     Gran % 78.1 (H) 38.0 - 73.0 %     Lymph % 14.6 (L) 18.0 - 48.0 %      Mono % 5.3 4.0 - 15.0 %     Eosinophil % 0.9 0.0 - 8.0 %     Basophil % 0.5 0.0 - 1.9 %     Differential Method Automated     Comprehensive metabolic panel   Result Value Ref Range     Sodium 142 136 - 145 mmol/L     Potassium 4.7 3.5 - 5.1 mmol/L     Chloride 108 95 - 110 mmol/L     CO2 21 (L) 23 - 29 mmol/L     Glucose 103 70 - 110 mg/dL     BUN 17 8 - 23 mg/dL     Creatinine 0.7 0.5 - 1.4 mg/dL     Calcium 9.1 8.7 - 10.5 mg/dL     Total Protein 6.7 6.0 - 8.4 g/dL     Albumin 4.1 3.5 - 5.2 g/dL     Total Bilirubin 0.4 0.1 - 1.0 mg/dL     Alkaline Phosphatase 49 (L) 55 - 135 U/L     AST 18 10 - 40 U/L     ALT 15 10 - 44 U/L     Anion Gap 13 8 - 16 mmol/L     eGFR >60 >60 mL/min/1.73 m^2            Imaging Results:  Imaging Results                  X-Ray Hip 2 or 3 views Right (with Pelvis when performed) (In process)                      Narrative & Impression  EXAMINATION:  XR HIP WITH PELVIS WHEN PERFORMED, 2 OR 3  VIEWS RIGHT     CLINICAL HISTORY:  XR HIP WITH PELVIS WHEN PERFORMED, 2 OR 3  VIEWS RIGHT     COMPARISON:  None     FINDINGS:  Multiple radiographic views  were obtained.     Irregularity of the superior rami of the right-side consistent with fracture.  No femoroacetabular fracture.     Impression:     As above        Electronically signed by: Kenney Guadalupe  Date:                                            03/03/2023  Time:                                           18:13    EXAMINATION:  CT PELVIS WITHOUT CONTRAST     CLINICAL HISTORY:  Pelvis pain, known fracture on x-ray     TECHNIQUE:  CT of the pelvis without     COMPARISON:  None     FINDINGS:  Comminuted fracture of the right superior pubic rami.  Fracture of the inferior pubic rami.  There is  associated small hematoma the anterior soft tissues of the anterior inferior abdomen wall.  Colonic diverticulosis.     Impression:     As above     All CT scans at this facility are performed  using dose modulation techniques as appropriate to performed  exam including the following:  automated exposure control; adjustment of mA and/or kV according to the patients size (this includes techniques or standardized protocols for targeted exams where dose is matched to indication/reason for exam: i.e. extremities or head);  iterative reconstruction technique.        Electronically signed by: Kenney Guadalupe  Date:                                            03/03/2023  Time:                                           18:51       I viewed and interpreted the images myself.  Addition to the anterior pubic ramus injury on the right side which involves the superior and inferior pubic rami she also has a right posterior fracture of the sacral ala that appears to be minimally displaced but does appear to have some small amount of comminution ventrally.  There does not appear to be any significant displacement of her pelvic ring        Assessment:  This is a 72-year-old female community ambulator who had a fall from standing earlier today with complaints this evening of right-sided anterior hip pain    OT a type B pelvic ring injury with minimal displacement, lateral compression fracture  Right inferior and superior comminuted ramus fracture  Right posterior sacral ala fracture without obvious displacement    Plan: This patient has a serious but low energy injury.  Treatment plan decision making revolved around the stability of the fracture and the ability of the patient to mobilize without subsequent displacement of her pelvic ring.    We will get baseline inlet and outlet films tonight  She will be admitted to the medicine service due to the fact that she has significant pain and is unable to mobilize her transfer currently  We will have Physical therapy see her in the morning and attempt to mobilize her with protected partial weight-bearing with a walker as her pain and symptoms allow  We will obtain post mobilization AP inlet and outlet films of her pelvis to assess for  stability  Okay to discharge once post mobilization films are cleared by ortho  Will need DVT prophylaxis per primary team  Appreciate medical management by primary team    Kenneth Sigala MD  Orthopedic Surgery

## 2023-03-04 NOTE — PLAN OF CARE
AAOx4. Son @ bedside. Pain is being managed on going. Encouraged to turn Q2. Pt remained free from fall and injury. No sign of any distress noted. Safety precautions alert. Pt asked to call for assistance if needed. IV access clean dry and intact saline locked. Chart checked reviewed. VSS. Plan of care continued.  .

## 2023-03-04 NOTE — PT/OT/SLP EVAL
Occupational Therapy   Evaluation    Name: Fang Quiles  MRN: [id number]  Admitting Diagnosis: Closed fracture of multiple pubic rami, right, initial encounter  Recent Surgery: * No surgery found *      Recommendations:     Discharge Recommendations: rehabilitation facility  Discharge Equipment Recommendations:  other (see comments) (TBD at next level of care)  Barriers to discharge:  None    Assessment:     Fang Quiles is a 72 y.o. female with a medical diagnosis of Closed fracture of multiple pubic rami, right, initial encounter.  She presents with the following performance deficits affecting function: weakness, impaired endurance, impaired self care skills, impaired functional mobility, gait instability, impaired balance, decreased coordination, decreased lower extremity function, pain, orthopedic precautions.      Rehab Prognosis: Good; patient would benefit from acute skilled OT services to address these deficits and reach maximum level of function.       Plan:     Patient to be seen 2 x/week to address the above listed problems via therapeutic exercises, therapeutic activities, self-care/home management  Plan of Care Expires: 03/18/23  Plan of Care Reviewed with: patient, son    Subjective     Chief Complaint: R pelvic/RLE pain  Patient/Family Comments/goals: improve mobility and return to PLOF    Occupational Profile:  Living Environment: lives with  in a 1 story house with ramp to enter. Pt reports limited physical assist from  and that son checks in with pt.   Previous level of function: Pt (I) with ADLs and functional mobility. Pt is active.  Roles and Routines: drives, gardens  Equipment Used at Home: none  Pt has but does not use: RW, shower chair, BSC, and grab bars  Assistance upon Discharge: unknown    Pain/Comfort:  Pain Rating 1: 7/10  Location - Side 1: Right  Location - Orientation 1: generalized  Location 1: other (see comments) (pelvis)  Pain Addressed 1:  Distraction    Objective:     Communicated with: Nurse and epic chart review prior to session.  Patient found supine with peripheral IV, bed alarm, PureWick upon OT entry to room.    General Precautions: Standard, fall  Orthopedic Precautions: RLE partial weight bearing  Braces: N/A  Respiratory Status: Room air    Occupational Performance:    Bed Mobility:    Patient completed Scooting/Bridging with minimum assistance  Patient completed Supine to Sit with moderate assistance    Functional Mobility/Transfers:  Patient completed Sit <> Stand Transfer with moderate assistance  with  rolling walker   Patient completed Bed <> Chair Transfer using Stand Pivot technique with moderate assistance with rolling walker  Functional Mobility: Patient completed x3ft steps functional mobility with Mod A, RW, and RLE PWB to increase dynamic standing balance and activity tolerance needed for ADL completion.     Activities of Daily Living:  Grooming: setup washed face in chair with A for item retrieval  Upper Body Dressing: minimum assistance hossein gown    Cognitive/Visual Perceptual:  Cognitive/Psychosocial Skills:     -       Oriented to: Person, Place, Time, and Situation   -       Follows Commands/attention:Follows multistep  commands  -       Communication: clear/fluent  -       Safety awareness/insight to disability: intact   -       Mood/Affect/Coping skills/emotional control: Cooperative and Pleasant    Physical Exam:  Sensation:    -       Intact  Upper Extremity Range of Motion:     -       Right Upper Extremity: WNL  -       Left Upper Extremity: WNL  Upper Extremity Strength:    -       Right Upper Extremity: 4/5 grossly  -       Left Upper Extremity: 4/5 grossly   Strength:    -       Right Upper Extremity: WNL  -       Left Upper Extremity: WNL    AMPAC 6 Click ADL:  AMPAC Total Score: 17    Treatment & Education:  Patient educated on role of OT in acute setting and benefits of participation. Educated on techniques  to use to increase independence and decrease fall risk with functional transfers. Educated on importance of OOB activity and calling for A to transfer back to bed/meet needs. Encouraged completion of B UE AROM therex throughout the day to tolerance to increase functional strength and activity tolerance. Educated patient on importance of increased tolerance to upright position and direct impact on CV endurance and strength. Patient encouraged to sit up in chair for a minimum of 2 consecutive hours per day. Patient stated understanding and in agreement with POC.     Patient left up in chair with all lines intact, call button in reach, nurse notified, and son present    GOALS:   Multidisciplinary Problems       Occupational Therapy Goals          Problem: Occupational Therapy    Goal Priority Disciplines Outcome Interventions   Occupational Therapy Goal     OT, PT/OT     Description: Goals to be met by: 3/18/23     Patient will increase functional independence with ADLs by performing:    Toileting from bedside commode with Stand-by Assistance for hygiene and clothing management.   Stand pivot transfers with Contact Guard Assistance with RW and RLE PWB.  Upper extremity exercise program x25 reps, with independence.                         History:     Past Medical History:   Diagnosis Date    Hypercholesteremia     Osteoporosis          Past Surgical History:   Procedure Laterality Date     SECTION      FRACTURE SURGERY         Time Tracking:     OT Date of Treatment: 23  OT Start Time: 740  OT Stop Time: 0810  OT Total Time (min): 30 min    Billable Minutes:Evaluation 20  Therapeutic Activity 10    3/4/2023  Cassi Khalil OT

## 2023-03-04 NOTE — ASSESSMENT & PLAN NOTE
Patient is chronically on statin.will not continue for now. Last Lipid Panel:   Lab Results   Component Value Date    CHOL 174 12/03/2021    HDL 48 12/03/2021    LDLCALC 97 12/03/2021    TRIG 146 12/03/2021    CHOLHDL 3.6 12/03/2021     Plan:  -Continue home medication  -low fat/low calorie diet

## 2023-03-04 NOTE — SUBJECTIVE & OBJECTIVE
Past Medical History:   Diagnosis Date    Hypercholesteremia     Osteoporosis        Past Surgical History:   Procedure Laterality Date     SECTION      FRACTURE SURGERY         Review of patient's allergies indicates:  No Known Allergies    No current facility-administered medications on file prior to encounter.     Current Outpatient Medications on File Prior to Encounter   Medication Sig    raloxifene (EVISTA) 60 mg tablet Take 60 mg by mouth once daily.    rosuvastatin (CRESTOR) 10 MG tablet Take 10 mg by mouth every evening.    lisinopriL 10 MG tablet Take 1 tablet (10 mg total) by mouth once daily. (Patient not taking: Reported on 3/3/2023)     Family History    None       Tobacco Use    Smoking status: Never    Smokeless tobacco: Never   Substance and Sexual Activity    Alcohol use: Never    Drug use: Never    Sexual activity: Not on file     Review of Systems   Musculoskeletal:  Positive for arthralgias, gait problem and myalgias. Negative for back pain, joint swelling, neck pain and neck stiffness.   All other systems reviewed and are negative.  Objective:     Vital Signs (Most Recent):  Temp: 99.4 °F (37.4 °C) (23 0015)  Pulse: 86 (23 0015)  Resp: 18 (23 0015)  BP: (!) 104/59 (23 0015)  SpO2: 95 % (23 0015)   Vital Signs (24h Range):  Temp:  [97.8 °F (36.6 °C)-99.9 °F (37.7 °C)] 99.4 °F (37.4 °C)  Pulse:  [69-89] 86  Resp:  [16-18] 18  SpO2:  [95 %-100 %] 95 %  BP: (104-163)/(59-87) 104/59     Weight: 80.7 kg (177 lb 14.6 oz)  Body mass index is 27.86 kg/m².    Physical Exam  Vitals and nursing note reviewed.   Constitutional:       General: She is awake. She is not in acute distress.     Appearance: Normal appearance. She is well-developed and well-groomed. She is not ill-appearing, toxic-appearing or diaphoretic.   HENT:      Head: Normocephalic and atraumatic.      Mouth/Throat:      Lips: Pink.      Mouth: Mucous membranes are moist.      Pharynx: Oropharynx is  clear. Uvula midline.   Eyes:      Extraocular Movements: Extraocular movements intact.      Conjunctiva/sclera: Conjunctivae normal.      Pupils: Pupils are equal, round, and reactive to light.   Cardiovascular:      Rate and Rhythm: Normal rate and regular rhythm.      Pulses:           Radial pulses are 2+ on the right side and 2+ on the left side.        Femoral pulses are 2+ on the right side and 2+ on the left side.       Popliteal pulses are 2+ on the right side and 2+ on the left side.        Dorsalis pedis pulses are 2+ on the right side and 2+ on the left side.      Heart sounds: Normal heart sounds. No murmur heard.  Pulmonary:      Effort: Pulmonary effort is normal.      Breath sounds: Normal breath sounds.   Abdominal:      General: Bowel sounds are normal.      Palpations: Abdomen is soft.      Tenderness: There is no abdominal tenderness.   Musculoskeletal:      Cervical back: Normal, normal range of motion and neck supple. No spinous process tenderness or muscular tenderness.      Thoracic back: Normal.      Lumbar back: Normal.      Right lower leg: No edema.      Left lower leg: No edema.      Comments: 5/5 strength throughout, with the exception to right lower extremity which has limited active and passive range of motion secondary to acute fracture of right inferior and superior pubic rami.  Wiggles all digits right foot.  Cap refill less than 2 seconds in all digits of right foot.  5/5 strength noted to be dorsiflexion/plantar flexion in both feet.  No obvious bruising, erythema or palpable crepitus or deformities noted to right hip.   Skin:     General: Skin is warm and dry.      Capillary Refill: Capillary refill takes less than 2 seconds.      Findings: No bruising, ecchymosis or erythema.   Neurological:      Mental Status: She is alert and oriented to person, place, and time. Mental status is at baseline.      GCS: GCS eye subscore is 4. GCS verbal subscore is 5. GCS motor subscore is 6.       Sensory: Sensation is intact.      Motor: Weakness (RLE 2/2 pain from acute fx of inferior/superior pubic rami.) present.      Comments: Gait deferred 2/2 to limited mobility of RLE from pain/acute fx.    Psychiatric:         Mood and Affect: Mood normal.         Speech: Speech normal.         Behavior: Behavior normal. Behavior is cooperative.         CRANIAL NERVES     CN III, IV, VI   Pupils are equal, round, and reactive to light.   LABS:  Recent Results (from the past 24 hour(s))   CBC auto differential    Collection Time: 03/03/23  5:33 PM   Result Value Ref Range    WBC 13.11 (H) 3.90 - 12.70 K/uL    RBC 4.66 4.00 - 5.40 M/uL    Hemoglobin 13.7 12.0 - 16.0 g/dL    Hematocrit 41.5 37.0 - 48.5 %    MCV 89 82 - 98 fL    MCH 29.4 27.0 - 31.0 pg    MCHC 33.0 32.0 - 36.0 g/dL    RDW 14.4 11.5 - 14.5 %    Platelets 215 150 - 450 K/uL    MPV 9.6 9.2 - 12.9 fL    Immature Granulocytes 0.6 (H) 0.0 - 0.5 %    Gran # (ANC) 10.2 (H) 1.8 - 7.7 K/uL    Immature Grans (Abs) 0.08 (H) 0.00 - 0.04 K/uL    Lymph # 1.9 1.0 - 4.8 K/uL    Mono # 0.7 0.3 - 1.0 K/uL    Eos # 0.1 0.0 - 0.5 K/uL    Baso # 0.07 0.00 - 0.20 K/uL    nRBC 0 0 /100 WBC    Gran % 78.1 (H) 38.0 - 73.0 %    Lymph % 14.6 (L) 18.0 - 48.0 %    Mono % 5.3 4.0 - 15.0 %    Eosinophil % 0.9 0.0 - 8.0 %    Basophil % 0.5 0.0 - 1.9 %    Differential Method Automated    Comprehensive metabolic panel    Collection Time: 03/03/23  5:33 PM   Result Value Ref Range    Sodium 142 136 - 145 mmol/L    Potassium 4.7 3.5 - 5.1 mmol/L    Chloride 108 95 - 110 mmol/L    CO2 21 (L) 23 - 29 mmol/L    Glucose 103 70 - 110 mg/dL    BUN 17 8 - 23 mg/dL    Creatinine 0.7 0.5 - 1.4 mg/dL    Calcium 9.1 8.7 - 10.5 mg/dL    Total Protein 6.7 6.0 - 8.4 g/dL    Albumin 4.1 3.5 - 5.2 g/dL    Total Bilirubin 0.4 0.1 - 1.0 mg/dL    Alkaline Phosphatase 49 (L) 55 - 135 U/L    AST 18 10 - 40 U/L    ALT 15 10 - 44 U/L    Anion Gap 13 8 - 16 mmol/L    eGFR >60 >60 mL/min/1.73 m^2        RADIOLOGY  X-Ray Hip 2 or 3 views Right (with Pelvis when performed)    Result Date: 3/3/2023  EXAMINATION: XR HIP WITH PELVIS WHEN PERFORMED, 2 OR 3  VIEWS RIGHT CLINICAL HISTORY: XR HIP WITH PELVIS WHEN PERFORMED, 2 OR 3  VIEWS RIGHT COMPARISON: None FINDINGS: Multiple radiographic views  were obtained. Irregularity of the superior rami of the right-side consistent with fracture.  No femoroacetabular fracture.     As above Electronically signed by: Kenney Guadalupe Date:    03/03/2023 Time:    18:13    CT Pelvis Without Contrast    Result Date: 3/3/2023  EXAMINATION: CT PELVIS WITHOUT CONTRAST CLINICAL HISTORY: Pelvis pain, known fracture on x-ray TECHNIQUE: CT of the pelvis without COMPARISON: None FINDINGS: Comminuted fracture of the right superior pubic rami.  Fracture of the inferior pubic rami.  There is  associated small hematoma the anterior soft tissues of the anterior inferior abdomen wall.  Colonic diverticulosis.     As above All CT scans at this facility are performed  using dose modulation techniques as appropriate to performed exam including the following:  automated exposure control; adjustment of mA and/or kV according to the patients size (this includes techniques or standardized protocols for targeted exams where dose is matched to indication/reason for exam: i.e. extremities or head);  iterative reconstruction technique. Electronically signed by: Kenney Guadalupe Date:    03/03/2023 Time:    18:51    X-Ray Pelvis AP Inlet And Outlet    Result Date: 3/3/2023  EXAMINATION: XR PELVIS AP INLET AND OUTLET CLINICAL HISTORY: XR PELVIS AP INLET AND OUTLET COMPARISON: None FINDINGS: Multiple radiographic views  were obtained. Fracture of the right superior pubic rami.  Suggestion of a Arriaga catheter.  See dedicated CT     As above Electronically signed by: Kenney Guadalupe Date:    03/03/2023 Time:    20:34      EKG    MICROBIOLOGY    MDM     Amount and/or Complexity of Data Reviewed  Clinical lab tests:  reviewed  Tests in the radiology section of CPT®: reviewed  Tests in the medicine section of CPT®: reviewed  Discussion of test results with the performing providers: yes  Decide to obtain previous medical records or to obtain history from someone other than the patient: yes  Review and summarize past medical records: yes  Discuss the patient with other providers: yes  Independent visualization of images, tracings, or specimens: yes

## 2023-03-04 NOTE — PT/OT/SLP EVAL
Physical Therapy Evaluation    Patient Name:  Fang Quiles   MRN:  [id number]    Recommendations:     Discharge Recommendations: rehabilitation facility   Discharge Equipment Recommendations:  (TBD AT NEXT LEVEL OF CARE)   Barriers to discharge: Decreased caregiver support    Assessment:     Fang Quiles is a 72 y.o. female admitted with a medical diagnosis of Closed fracture of multiple pubic rami, right, initial encounter.  She presents with the following impairments/functional limitations: weakness, impaired endurance, impaired balance, impaired self care skills, impaired functional mobility, decreased coordination, pain, orthopedic precautions, decreased ROM, decreased lower extremity function, gait instability .    Rehab Prognosis: Good; patient would benefit from acute skilled PT services to address these deficits and reach maximum level of function.    Recent Surgery: * No surgery found *      Plan:     During this hospitalization, patient to be seen 3 x/week to address the identified rehab impairments via gait training, therapeutic activities, therapeutic exercises and progress toward the following goals:    Plan of Care Expires:  03/18/23    Subjective     Chief Complaint: PAIN   Patient/Family Comments/goals: INC MOBILITY AND RETURN TO PLOF  Pain/Comfort:  Pain Rating 1: 6/10  Location - Side 1: Right  Location 1: other (see comments) ([facility])    Patients cultural, spiritual, Latter-day conflicts given the current situation:      Living Environment:  PT LIVES AT HOME WITH   IN A ONE STORY HOME WITH RAMP TO ENTER HOME   Prior to admission, patients level of function was IND AND DRIVES AND ENJOYS GARDENING.  Equipment used at home: none.  DME owned (not currently used): rolling walker and shower chair.  Upon discharge, patient will have assistance from UNKNOWN .    Objective:     Communicated with NURSE VICKERS AND Epic CHART REVIEW  prior to session.  Patient found supine with peripheral IV,  "[employer]  upon PT entry to room.    General Precautions: Standard, fall  Orthopedic Precautions:RLE partial weight bearing   Braces: N/A  Respiratory Status: Room air    Exams:  RLE ROM: LIMITED  RLE Strength: NA D/T PAIN  LLE ROM: WFL  LLE Strength: WFL    Functional Mobility:  Bed Mobility:     Scooting: minimum assistance  Supine to Sit: moderate assistance  Transfers:     Sit to Stand:  moderate assistance with standard walker  Bed to Chair: moderate assistance with  rolling walker  using  Stand Pivot  Gait: PT GT TRAINED X 3 STEPS WITH PWB R LE AND MOD A      AM-PAC 6 CLICK MOBILITY  Total Score:12       Treatment & Education:  PT T/F TO CHAIR WITH RW AND MOD A WITH INC PAIN. PT WITH REST BREAK. PT EDUCATED ON TE AND COMPLETED AP, TKE AND GLUT SETS. PT LEFT SEATED AND EDUCATED ON ROLE OF PT. PT EDUCATED ON RISK FOR FALLS DUE TO GENERALIZED WEAKNESS, EDUCATED ON "CALL DON'T FALL", ENCOURAGED TO CALL FOR ASSISTANCE WITH ALL NEEDS SUCH AS BED<>CHAIR TRANSFERS OR TRIPS TO BATHROOM      Patient left up in chair with call button in reach and NURSE notified.    GOALS:   Multidisciplinary Problems       Physical Therapy Goals          Problem: Physical Therapy    Goal Priority Disciplines Outcome Goal Variances Interventions   Physical Therapy Goal     PT, PT/OT      Description: LTG:3/18/23  1. PT WILL COMPLETE BED MOBILITY WITH MIN A  2. PT WILL STAND T.F TO CHAIR WITH RW PWB R LE AND CGA  3. PT WILL GT TRAIN X 30' WITH RW AND PWB R LE  4. PT WILL TOLERANCE 20 REPS OF B LE TE X FOR ROM AND STRENGTHENING.                        History:     Past Medical History:   Diagnosis Date    Hypercholesteremia     Osteoporosis        Past Surgical History:   Procedure Laterality Date     SECTION      FRACTURE SURGERY         Time Tracking:     PT Received On: 23  PT Start Time: 812     PT Stop Time: 836  PT Total Time (min): 24 min     Billable Minutes: Evaluation 14 and Therapeutic Activity 10      2023  "

## 2023-03-04 NOTE — ASSESSMENT & PLAN NOTE
CT pelvis revealed: Comminuted fracture of the right superior pubic rami.  Fracture of the inferior pubic rami. There is  associated small hematoma the anterior soft tissues of the anterior inferior abdomen wall.  Colonic diverticulosis.    Ortho consulted and stated injury is non operative. NVS grossly intact.  Plan:  -analgesics prn  -PT/OT eval in am  -fall precautions    Awaiting pt/ot recs  Possible placement needed

## 2023-03-04 NOTE — ED NOTES
Pt resting in ER stretcher, aaox4, rr e/u, NAD noted. Bed low and locked, call light in reach, side rails up x2. Son at the bedside. Pt verbalized understanding of status and POC; denies further needs. Will continue to monitor.

## 2023-03-04 NOTE — ASSESSMENT & PLAN NOTE
Currently normotensive. BP usually well controlled per patient with diet. Not currently on home medications.  Plan:  -Optimize pain control   -Monitor BP  -Low salt/cardiac diet when not NPO  -IV hydralazine prn for SBP>160 or DBP>90          Is This A New Presentation, Or A Follow-Up?: Follow Up Imiquimod Therapy What Type Of Imiquimod Are You Applying: generic 5% cream How Severe Are The Lesions?: moderate How Many Weeks Of Imiquimod Have You Completed?: 4

## 2023-03-04 NOTE — HPI
Fang Quiles is a 72 y.o. female with a PMH  has a past medical history of Hypercholesteremia and Osteoporosis.  Presented to the ER for evaluation of right hip pain after having an mechanical fall in her house prior to arrival.  Patient reports landing on her buttock/right hip and states she felt immediate pain.  Patient was unable to get self up off the floor or bear weight on her right lower extremity.  EMS was called to bring patient to hospital for evaluation.  Denies hitting head or losing consciousness.  Initially rated pain 10/10.  Currently rates pain 2/10.  Alleviating factors include pain medicine (fentanyl) given in in route to ED by EMS personnel as well as keeping right lower extremity still .  Aggravating factors include movement palpation over right hip.  denies any numbness/tingling to her right lower extremity.  Denies any other symptoms at this time.ER workup revealed leukocytosis of 13.11 likely reactive to acute fracture seen on imaging.  CT imaging of the pelvis revealed: [Comminuted fracture of the right superior pubic rami.  Fracture of the inferior pubic rami. There is  associated small hematoma the anterior soft tissues of the anterior inferior abdomen wall.  Colonic diverticulosis].  Orthopedic provider on-call consult.  Injury non operative.  Recommended Hospital Medicine to admit for pain control and PT OT eval in a.m..  Patient family at bedside are in agreement with treatment plan.  Patient will be placed in observation status.  PCP: Farida Patel

## 2023-03-04 NOTE — PHARMACY MED REC
"Admission Medication History     The home medication history was taken by Matthew Mccartney.    You may go to "Admission" then "Reconcile Home Medications" tabs to review and/or act upon these items.     The home medication list has been updated by the Pharmacy department.   Please read ALL comments highlighted in yellow.   Please address this information as you see fit.    Feel free to contact us if you have any questions or require assistance.      Medications listed below were obtained from: Patient/family and Analytic software- [employer]  (Not in a hospital admission)    Matthew Mccartney  [id number]    Current Outpatient Medications on File Prior to Encounter   Medication Sig Dispense Refill Last Dose    raloxifene (EVISTA) 60 mg tablet Take 60 mg by mouth once daily.   3/2/2023    rosuvastatin (CRESTOR) 10 MG tablet Take 10 mg by mouth every evening.   3/2/2023    lisinopriL 10 MG tablet Take 1 tablet (10 mg total) by mouth once daily. (Patient not taking: Reported on 3/3/2023) 90 tablet 3 Not Taking                           .        "

## 2023-03-04 NOTE — HOSPITAL COURSE
3/4: cont norco and morphine prn pain. Pt/ot consulted on case. Awaiting recs. Possible dc tomorrow if pain controlled. Fracture non operable per ortho.   3/6: rehab recommended by PT/OT and approved for [facility]. However, pt pain uncontrolled 10/10 with movement of RLE, will adjust PO meds to Percocet 10mg. Pain improved on PO pain meds. Was able to with with OT/PT. Pt DC to SNF in stable condition.

## 2023-03-04 NOTE — ASSESSMENT & PLAN NOTE
CT pelvis revealed: Comminuted fracture of the right superior pubic rami.  Fracture of the inferior pubic rami. There is  associated small hematoma the anterior soft tissues of the anterior inferior abdomen wall.  Colonic diverticulosis.    Ortho consulted and stated injury is non operative. NVS grossly intact.  Plan:  -analgesics prn  -PT/OT eval in am  -fall precautions

## 2023-03-04 NOTE — H&P
[facility] Medicine  History & Physical    Patient Name: Fang Quiles  MRN: [id number]  Patient Class: OP- Observation  Admission Date: 3/3/2023  Attending Physician: Jan Davis MD   Primary Care Provider: Farida Patel MD         Patient information was obtained from patient, past medical records and ER records.     Subjective:     Principal Problem:Closed fracture of multiple pubic rami, right, initial encounter    Chief Complaint:   Chief Complaint   Patient presents with    Fall     Right hip pain with shortening and rotation after fall        HPI: Fang Quiles is a 72 y.o. female with a PMH  has a past medical history of Hypercholesteremia and Osteoporosis.  Presented to the ER for evaluation of right hip pain after having an mechanical fall in her house prior to arrival.  Patient reports landing on her buttock/right hip and states she felt immediate pain.  Patient was unable to get self up off the floor or bear weight on her right lower extremity.  EMS was called to bring patient to hospital for evaluation.  Denies hitting head or losing consciousness.  Initially rated pain 10/10.  Currently rates pain 2/10.  Alleviating factors include pain medicine (fentanyl) given in in route to ED by EMS personnel as well as keeping right lower extremity still .  Aggravating factors include movement palpation over right hip.  denies any numbness/tingling to her right lower extremity.  Denies any other symptoms at this time.ER workup revealed leukocytosis of 13.11 likely reactive to acute fracture seen on imaging.  CT imaging of the pelvis revealed: [Comminuted fracture of the right superior pubic rami.  Fracture of the inferior pubic rami. There is  associated small hematoma the anterior soft tissues of the anterior inferior abdomen wall.  Colonic diverticulosis].  Orthopedic provider on-call consult.  Injury non operative.  Recommended Hospital Medicine to admit for pain control and PT OT  eval in a.m..  Patient family at bedside are in agreement with treatment plan.  Patient will be placed in observation status.  PCP: Farida Patel        Past Medical History:   Diagnosis Date    Hypercholesteremia     Osteoporosis        Past Surgical History:   Procedure Laterality Date     SECTION      FRACTURE SURGERY         Review of patient's allergies indicates:  No Known Allergies    No current facility-administered medications on file prior to encounter.     Current Outpatient Medications on File Prior to Encounter   Medication Sig    raloxifene (EVISTA) 60 mg tablet Take 60 mg by mouth once daily.    rosuvastatin (CRESTOR) 10 MG tablet Take 10 mg by mouth every evening.    lisinopriL 10 MG tablet Take 1 tablet (10 mg total) by mouth once daily. (Patient not taking: Reported on 3/3/2023)     Family History    None       Tobacco Use    Smoking status: Never    Smokeless tobacco: Never   Substance and Sexual Activity    Alcohol use: Never    Drug use: Never    Sexual activity: Not on file     Review of Systems   Musculoskeletal:  Positive for arthralgias, gait problem and myalgias. Negative for back pain, joint swelling, neck pain and neck stiffness.   All other systems reviewed and are negative.  Objective:     Vital Signs (Most Recent):  Temp: 99.4 °F (37.4 °C) (23 001)  Pulse: 86 (23)  Resp: 18 (23 001)  BP: (!) 104/59 (23)  SpO2: 95 % (23)   Vital Signs (24h Range):  Temp:  [97.8 °F (36.6 °C)-99.9 °F (37.7 °C)] 99.4 °F (37.4 °C)  Pulse:  [69-89] 86  Resp:  [16-18] 18  SpO2:  [95 %-100 %] 95 %  BP: (104-163)/(59-87) 104/59     Weight: 80.7 kg (177 lb 14.6 oz)  Body mass index is 27.86 kg/m².    Physical Exam  Vitals and nursing note reviewed.   Constitutional:       General: She is awake. She is not in acute distress.     Appearance: Normal appearance. She is well-developed and well-groomed. She is not ill-appearing, toxic-appearing or  diaphoretic.   HENT:      Head: Normocephalic and atraumatic.      Mouth/Throat:      Lips: Pink.      Mouth: Mucous membranes are moist.      Pharynx: Oropharynx is clear. Uvula midline.   Eyes:      Extraocular Movements: Extraocular movements intact.      Conjunctiva/sclera: Conjunctivae normal.      Pupils: Pupils are equal, round, and reactive to light.   Cardiovascular:      Rate and Rhythm: Normal rate and regular rhythm.      Pulses:           Radial pulses are 2+ on the right side and 2+ on the left side.        Femoral pulses are 2+ on the right side and 2+ on the left side.       Popliteal pulses are 2+ on the right side and 2+ on the left side.        Dorsalis pedis pulses are 2+ on the right side and 2+ on the left side.      Heart sounds: Normal heart sounds. No murmur heard.  Pulmonary:      Effort: Pulmonary effort is normal.      Breath sounds: Normal breath sounds.   Abdominal:      General: Bowel sounds are normal.      Palpations: Abdomen is soft.      Tenderness: There is no abdominal tenderness.   Musculoskeletal:      Cervical back: Normal, normal range of motion and neck supple. No spinous process tenderness or muscular tenderness.      Thoracic back: Normal.      Lumbar back: Normal.      Right lower leg: No edema.      Left lower leg: No edema.      Comments: 5/5 strength throughout, with the exception to right lower extremity which has limited active and passive range of motion secondary to acute fracture of right inferior and superior pubic rami.  Wiggles all digits right foot.  Cap refill less than 2 seconds in all digits of right foot.  5/5 strength noted to be dorsiflexion/plantar flexion in both feet.  No obvious bruising, erythema or palpable crepitus or deformities noted to right hip.   Skin:     General: Skin is warm and dry.      Capillary Refill: Capillary refill takes less than 2 seconds.      Findings: No bruising, ecchymosis or erythema.   Neurological:      Mental Status: She  is alert and oriented to person, place, and time. Mental status is at baseline.      GCS: GCS eye subscore is 4. GCS verbal subscore is 5. GCS motor subscore is 6.      Sensory: Sensation is intact.      Motor: Weakness (RLE 2/2 pain from acute fx of inferior/superior pubic rami.) present.      Comments: Gait deferred 2/2 to limited mobility of RLE from pain/acute fx.    Psychiatric:         Mood and Affect: Mood normal.         Speech: Speech normal.         Behavior: Behavior normal. Behavior is cooperative.         CRANIAL NERVES     CN III, IV, VI   Pupils are equal, round, and reactive to light.   LABS:  Recent Results (from the past 24 hour(s))   CBC auto differential    Collection Time: 03/03/23  5:33 PM   Result Value Ref Range    WBC 13.11 (H) 3.90 - 12.70 K/uL    RBC 4.66 4.00 - 5.40 M/uL    Hemoglobin 13.7 12.0 - 16.0 g/dL    Hematocrit 41.5 37.0 - 48.5 %    MCV 89 82 - 98 fL    MCH 29.4 27.0 - 31.0 pg    MCHC 33.0 32.0 - 36.0 g/dL    RDW 14.4 11.5 - 14.5 %    Platelets 215 150 - 450 K/uL    MPV 9.6 9.2 - 12.9 fL    Immature Granulocytes 0.6 (H) 0.0 - 0.5 %    Gran # (ANC) 10.2 (H) 1.8 - 7.7 K/uL    Immature Grans (Abs) 0.08 (H) 0.00 - 0.04 K/uL    Lymph # 1.9 1.0 - 4.8 K/uL    Mono # 0.7 0.3 - 1.0 K/uL    Eos # 0.1 0.0 - 0.5 K/uL    Baso # 0.07 0.00 - 0.20 K/uL    nRBC 0 0 /100 WBC    Gran % 78.1 (H) 38.0 - 73.0 %    Lymph % 14.6 (L) 18.0 - 48.0 %    Mono % 5.3 4.0 - 15.0 %    Eosinophil % 0.9 0.0 - 8.0 %    Basophil % 0.5 0.0 - 1.9 %    Differential Method Automated    Comprehensive metabolic panel    Collection Time: 03/03/23  5:33 PM   Result Value Ref Range    Sodium 142 136 - 145 mmol/L    Potassium 4.7 3.5 - 5.1 mmol/L    Chloride 108 95 - 110 mmol/L    CO2 21 (L) 23 - 29 mmol/L    Glucose 103 70 - 110 mg/dL    BUN 17 8 - 23 mg/dL    Creatinine 0.7 0.5 - 1.4 mg/dL    Calcium 9.1 8.7 - 10.5 mg/dL    Total Protein 6.7 6.0 - 8.4 g/dL    Albumin 4.1 3.5 - 5.2 g/dL    Total Bilirubin 0.4 0.1 - 1.0 mg/dL     Alkaline Phosphatase 49 (L) 55 - 135 U/L    AST 18 10 - 40 U/L    ALT 15 10 - 44 U/L    Anion Gap 13 8 - 16 mmol/L    eGFR >60 >60 mL/min/1.73 m^2       RADIOLOGY  X-Ray Hip 2 or 3 views Right (with Pelvis when performed)    Result Date: 3/3/2023  EXAMINATION: XR HIP WITH PELVIS WHEN PERFORMED, 2 OR 3  VIEWS RIGHT CLINICAL HISTORY: XR HIP WITH PELVIS WHEN PERFORMED, 2 OR 3  VIEWS RIGHT COMPARISON: None FINDINGS: Multiple radiographic views  were obtained. Irregularity of the superior rami of the right-side consistent with fracture.  No femoroacetabular fracture.     As above Electronically signed by: Kenney Guadalupe Date:    03/03/2023 Time:    18:13    CT Pelvis Without Contrast    Result Date: 3/3/2023  EXAMINATION: CT PELVIS WITHOUT CONTRAST CLINICAL HISTORY: Pelvis pain, known fracture on x-ray TECHNIQUE: CT of the pelvis without COMPARISON: None FINDINGS: Comminuted fracture of the right superior pubic rami.  Fracture of the inferior pubic rami.  There is  associated small hematoma the anterior soft tissues of the anterior inferior abdomen wall.  Colonic diverticulosis.     As above All CT scans at this facility are performed  using dose modulation techniques as appropriate to performed exam including the following:  automated exposure control; adjustment of mA and/or kV according to the patients size (this includes techniques or standardized protocols for targeted exams where dose is matched to indication/reason for exam: i.e. extremities or head);  iterative reconstruction technique. Electronically signed by: Kenney Guadalupe Date:    03/03/2023 Time:    18:51    X-Ray Pelvis AP Inlet And Outlet    Result Date: 3/3/2023  EXAMINATION: XR PELVIS AP INLET AND OUTLET CLINICAL HISTORY: XR PELVIS AP INLET AND OUTLET COMPARISON: None FINDINGS: Multiple radiographic views  were obtained. Fracture of the right superior pubic rami.  Suggestion of a Arriaga catheter.  See dedicated CT     As above Electronically signed  by: Kenney Horneri Date:    03/03/2023 Time:    20:34      EKG    MICROBIOLOGY    MDM     Amount and/or Complexity of Data Reviewed  Clinical lab tests: reviewed  Tests in the radiology section of CPT®: reviewed  Tests in the medicine section of CPT®: reviewed  Discussion of test results with the performing providers: yes  Decide to obtain previous medical records or to obtain history from someone other than the patient: yes  Review and summarize past medical records: yes  Discuss the patient with other providers: yes  Independent visualization of images, tracings, or specimens: yes          Assessment/Plan:     * Closed fracture of multiple pubic rami, right, initial encounter  CT pelvis revealed: Comminuted fracture of the right superior pubic rami.  Fracture of the inferior pubic rami. There is  associated small hematoma the anterior soft tissues of the anterior inferior abdomen wall.  Colonic diverticulosis.    Ortho consulted and stated injury is non operative. NVS grossly intact.  Plan:  -analgesics prn  -PT/OT eval in am  -fall precautions      Osteoporosis  Currently taking raloxifene 60mg daily. Reports compliance with home medications.  Plan:  -continue raloxifene 60mg qd.    Benign essential HTN  Currently normotensive. BP usually well controlled per patient with diet. Not currently on home medications.  Plan:  -Optimize pain control   -Monitor BP  -Low salt/cardiac diet when not NPO  -IV hydralazine prn for SBP>160 or DBP>90           Hyperlipidemia  Patient is chronically on statin.will not continue for now. Last Lipid Panel:   Lab Results   Component Value Date    CHOL 174 12/03/2021    HDL 48 12/03/2021    LDLCALC 97 12/03/2021    TRIG 146 12/03/2021    CHOLHDL 3.6 12/03/2021     Plan:  -Continue home medication  -low fat/low calorie diet        Gastroesophageal reflux disease without esophagitis  Chronic. Stable. Currently asymptomatic. Home medications include PPI/Antacids as  needed.  Plan:  -Continue PPI/Antacids as needed         VTE Risk Mitigation (From admission, onward)         Ordered     enoxaparin injection 40 mg  Daily         03/03/23 2029     IP VTE HIGH RISK PATIENT  Once         03/03/23 2029     Place sequential compression device  Until discontinued         03/03/23 2029               //Core Measures   -DVT proph: SCDs, lovenox  -Code status full  -Surrogate: son    Components of this note were documented using a voice recognition system and are subject to errors not corrected at the time the document was proof read. Please contact the author for any clarifications.       Matt Davis NP  Department of Hospital Medicine   [facility] - Med Surg

## 2023-03-04 NOTE — ASSESSMENT & PLAN NOTE
Currently taking raloxifene 60mg daily. Reports compliance with home medications.  Plan:  -continue raloxifene 60mg qd.

## 2023-03-05 PROCEDURE — 11000001 HC ACUTE MED/SURG PRIVATE ROOM

## 2023-03-05 PROCEDURE — 25000003 PHARM REV CODE 250: Performed by: NURSE PRACTITIONER

## 2023-03-05 PROCEDURE — 94799 UNLISTED PULMONARY SVC/PX: CPT

## 2023-03-05 PROCEDURE — 63600175 PHARM REV CODE 636 W HCPCS: Performed by: NURSE PRACTITIONER

## 2023-03-05 PROCEDURE — 94761 N-INVAS EAR/PLS OXIMETRY MLT: CPT

## 2023-03-05 PROCEDURE — 99900035 HC TECH TIME PER 15 MIN (STAT)

## 2023-03-05 PROCEDURE — 97530 THERAPEUTIC ACTIVITIES: CPT

## 2023-03-05 RX ADMIN — HYDROCODONE BITARTRATE AND ACETAMINOPHEN 1 TABLET: 5; 325 TABLET ORAL at 03:03

## 2023-03-05 RX ADMIN — HYDROCODONE BITARTRATE AND ACETAMINOPHEN 1 TABLET: 5; 325 TABLET ORAL at 09:03

## 2023-03-05 RX ADMIN — RALOXIFENE 60 MG: 60 TABLET ORAL at 09:03

## 2023-03-05 RX ADMIN — ENOXAPARIN SODIUM 40 MG: 40 INJECTION SUBCUTANEOUS at 05:03

## 2023-03-05 RX ADMIN — ATORVASTATIN CALCIUM 40 MG: 40 TABLET, FILM COATED ORAL at 08:03

## 2023-03-05 NOTE — PROGRESS NOTES
72-year-old female   Fall   Right LC 1 pelvic ring injury     She is been able to get to the chair with help from therapy and a walker  Pain is little better from yesterday     Able to perform a heel slide and to some degree straight leg raise     Discussed diagnosis  Discussed non operative versus operative management options     Patient like to continue non operative management     Weightbear as tolerated with walker   Calcium, vitamin-D   Fragility fracture Clinic referral

## 2023-03-05 NOTE — ASSESSMENT & PLAN NOTE
CT pelvis revealed: Comminuted fracture of the right superior pubic rami.  Fracture of the inferior pubic rami. There is  associated small hematoma the anterior soft tissues of the anterior inferior abdomen wall.  Colonic diverticulosis.    Ortho consulted and stated injury is non operative. NVS grossly intact.  Plan:  -analgesics prn  -fall precautions    PT/OT recommending rehab  SNF consulted   Pain controlled

## 2023-03-05 NOTE — CONSULTS
Met with patient to discuss rehab options.  Patient's son immediately indicated that they prefer [facility].  List provided of additional facilities with plan that family will review and provide CM with 2 to 3 additional choices in the event [facility] is unable to take pateint.

## 2023-03-05 NOTE — PLAN OF CARE
AAOx4. PT son @ bedside. Pain being managed on going. Pt remained free from fall and injury. No sign of any distress noted. Safety precautions alert. Pt asked to call for assistance if needed. IV access clean dry and intact saline locked. Chart checked reviewed. VSS.Plan of care continued.

## 2023-03-05 NOTE — PT/OT/SLP PROGRESS
Physical Therapy Treatment    Patient Name:  Fang Quiles   MRN:  [id number]    Recommendations:     Discharge Recommendations: rehabilitation facility  Discharge Equipment Recommendations: other (see comments) (TBD at next level of care)  Barriers to discharge: None    Assessment:     Fang Quiles is a 72 y.o. female admitted with a medical diagnosis of Closed fracture of multiple pubic rami, right, initial encounter.  She presents with the following impairments/functional limitations: weakness, gait instability, decreased ROM, impaired endurance, impaired balance, decreased lower extremity function, impaired functional mobility, pain .    Rehab Prognosis: Good; patient would benefit from acute skilled PT services to address these deficits and reach maximum level of function.    Recent Surgery: * No surgery found *      Plan:     During this hospitalization, patient to be seen 3 x/week to address the identified rehab impairments via gait training, therapeutic activities, therapeutic exercises, neuromuscular re-education and progress toward the following goals:    Plan of Care Expires:  03/18/23    Subjective     Chief Complaint: pain with moving  Patient/Family Comments/goals: less pain and walking better  Pain/Comfort:  Pain Rating 1: 10/10 (0 at rest and up to 10 with mobility due to R groin pain at fx)  Pain Addressed 1: Pre-medicate for activity, Reposition, Distraction, Cessation of Activity  Pain Addressed 2: Pre-medicate for activity, Reposition, Distraction, Cessation of Activity  Pain Rating Post-Intervention 2: 2/10      Objective:     Communicated with nurse Yamilet prior to session.  Patient found HOB elevated with peripheral IV, PureWick upon PT entry to room.     General Precautions: Standard, fall  Orthopedic Precautions: RLE weight bearing as tolerated (Ortho changed status on 3/4/23 pm (Dr Alvaerz) to WBAT R LE)  Braces: N/A  Respiratory Status: Room air       AM-PAC 6 CLICK MOBILITY  Turning  over in bed (including adjusting bedclothes, sheets and blankets)?: 2  Sitting down on and standing up from a chair with arms (e.g., wheelchair, bedside commode, etc.): 2  Moving from lying on back to sitting on the side of the bed?: 2  Moving to and from a bed to a chair (including a wheelchair)?: 2  Need to walk in hospital room?: 2  Climbing 3-5 steps with a railing?: 1  Basic Mobility Total Score: 11       Treatment & Education:  Pt worked with PT on heel slides and ankle pumps for 2x 10 followed by roll/scoot to L side of the bed with up to MOD A to aid in R LE management. Pt performed LAQ for 1x 10 B seated. Pt performed sit to stand from elevated bed with MOD A x 1 and was able to take up to 3 mild steps with R LE while L LE pivoted to chair.    Patient left up in chair with all lines intact, call button in reach, bed alarm on, and Yamilet nurse notified..    GOALS:   Multidisciplinary Problems       Physical Therapy Goals          Problem: Physical Therapy    Goal Priority Disciplines Outcome Goal Variances Interventions   Physical Therapy Goal     PT, PT/OT Ongoing, Progressing     Description: LTG:3/18/23  1. PT WILL COMPLETE BED MOBILITY WITH MIN A  2. PT WILL STAND T.F TO CHAIR WITH RW PWB R LE AND CGA  3. PT WILL GT TRAIN X 30' WITH RW AND PWB R LE  4. PT WILL TOLERANCE 20 REPS OF B LE TE X FOR ROM AND STRENGTHENING.                        Time Tracking:     PT Received On: 03/05/23  PT Start Time: 1028     PT Stop Time: 1101  PT Total Time (min): 33 min     Billable Minutes: Therapeutic Activity 33    Treatment Type: Treatment  PT/PTA: PT     PTA Visit Number: 0     03/05/2023

## 2023-03-05 NOTE — PLAN OF CARE
PT worked with pt on R LE therex including heel slides and ankle pumps B 2x  10, seated EOB LAQ 1x 10 B.Transfers included bed mobility with rolling/scooting to the L side of the bed with MOD A x 1. PT continued with elevating the bed for sit to stand transfers with MOD A x 1 and pt ambulated with MOD A x1 with pivoting on L LE and short steps with R to chair x 3 steps. Pt sat up in chair at end of session and to call nursing when ready to return to bed.

## 2023-03-05 NOTE — SUBJECTIVE & OBJECTIVE
Interval History: No acute issues overnight. Pain controlled.     Review of Systems   Constitutional:  Negative for fatigue and fever.   HENT:  Negative for sinus pressure.    Eyes:  Negative for visual disturbance.   Respiratory:  Negative for shortness of breath.    Cardiovascular:  Negative for chest pain.   Gastrointestinal:  Negative for nausea and vomiting.   Genitourinary:  Negative for difficulty urinating.   Musculoskeletal:  Positive for arthralgias and gait problem. Negative for back pain.   Skin:  Negative for rash.   Neurological:  Negative for headaches.   Psychiatric/Behavioral:  Negative for confusion.    Objective:     Vital Signs (Most Recent):  Temp: 98.1 °F (36.7 °C) (03/05/23 1108)  Pulse: 79 (03/05/23 1108)  Resp: 20 (03/05/23 1108)  BP: (!) 109/55 (03/05/23 1108)  SpO2: 98 % (03/05/23 1108)   Vital Signs (24h Range):  Temp:  [98.1 °F (36.7 °C)-99.8 °F (37.7 °C)] 98.1 °F (36.7 °C)  Pulse:  [79-94] 79  Resp:  [14-20] 20  SpO2:  [91 %-98 %] 98 %  BP: (107-118)/(52-60) 109/55     Weight: 82.6 kg (182 lb 1.6 oz)  Body mass index is 28.52 kg/m².    Intake/Output Summary (Last 24 hours) at 3/5/2023 1223  Last data filed at 3/5/2023 0519  Gross per 24 hour   Intake 780 ml   Output 1800 ml   Net -1020 ml      Physical Exam  Constitutional:       General: She is not in acute distress.     Appearance: She is well-developed. She is not diaphoretic.   HENT:      Head: Normocephalic and atraumatic.   Eyes:      Pupils: Pupils are equal, round, and reactive to light.   Cardiovascular:      Rate and Rhythm: Normal rate and regular rhythm.      Heart sounds: Normal heart sounds. No murmur heard.    No friction rub. No gallop.   Pulmonary:      Effort: Pulmonary effort is normal. No respiratory distress.      Breath sounds: Normal breath sounds. No stridor. No wheezing or rales.   Abdominal:      General: Bowel sounds are normal. There is no distension.      Palpations: Abdomen is soft. There is no mass.       Tenderness: There is no abdominal tenderness. There is no guarding.   Musculoskeletal:      Comments: ROM limited   Skin:     General: Skin is warm.      Findings: No erythema.   Neurological:      Mental Status: She is alert and oriented to person, place, and time.       Significant Labs: All pertinent labs within the past 24 hours have been reviewed.    Significant Imaging: I have reviewed all pertinent imaging results/findings within the past 24 hours.

## 2023-03-05 NOTE — PLAN OF CARE
[facility] - Med [facility]  Initial Discharge Assessment       Primary Care Provider: Farida Patel MD    Admission Diagnosis: Chest pain [R07.9]  Pre-op evaluation [Z01.818]  Multiple closed fractures of pelvis without disruption of pelvic ring, initial encounter [S32.82XA]    Admission Date: 3/3/2023  Expected Discharge Date:     Discharge Barriers Identified: None    Payor: MEDICARE / Plan: MEDICARE PART A & B / Product Type: Government /     Extended Emergency Contact Information  Primary Emergency Contact: Micah Quiles  Mobile Phone: [phone]  Relation: Spouse  Preferred language: English   needed? No    Discharge Plan A: Rehab  Discharge Plan B: Home with family      [place] - [address] [facility] [address]  [address] [facility] [address]  [facility] [address]  Phone: [phone] Fax: [phone]      Initial Assessment (most recent)       Adult Discharge Assessment - 03/05/23 1057          Discharge Assessment    Assessment Type Discharge Planning Assessment     Confirmed/corrected address, phone number and insurance Yes     Confirmed Demographics Correct on Facesheet     Source of Information patient     When was your last doctors appointment? --   August 2022    Does patient/caregiver understand observation status Yes     Reason For Admission Broken Hip     People in Home spouse     Do you expect to return to your current living situation? No     Do you have help at home or someone to help you manage your care at home? Yes     Who are your caregiver(s) and their phone number(s)? Spouse Micah Quiles [phone]     Prior to hospitilization cognitive status: Alert/Oriented     Current cognitive status: Alert/Oriented     Equipment Currently Used at Home none     Readmission within 30 days? No     Patient currently being followed by outpatient case management? No     Do you currently have service(s) that help you manage your care at home? No     Do you take prescription medications? Yes     Do you  have prescription coverage? Yes     Coverage Medicare     Do you have any problems affording any of your prescribed medications? No     Is the patient taking medications as prescribed? yes     Who is going to help you get home at discharge? Spouse Fang Quiles     How do you get to doctors appointments? family or friend will provide     Are you on dialysis? No     Do you take coumadin? No     Discharge Plan A Rehab     Discharge Plan B Home with family     DME Needed Upon Discharge  none     Discharge Plan discussed with: Patient     Discharge Barriers Identified None        OTHER    Name(s) of People in Home Spouse jaylene Quiles                   SW met with pt at the bedside to complete d/c assessment. Pt open to possible rehab/SNF due to not being a candidate for surgery. CM provided a transitional care folder, information on advanced directives, information on pharmacy bedside delivery, and discharge planning begins on admission with contact information for any needs/questions. TAZ,MSW

## 2023-03-05 NOTE — PROGRESS NOTES
[facility]  [facility]    Patient Name: Fang Quiles  MRN: [id number]  Patient Class: IP- Inpatient   Admission Date: 3/3/2023  Length of Stay: 1 days  Attending Physician: Dimitry Martin MD  Primary Care Provider: Farida Patel MD        Subjective:     Principal Problem:Closed fracture of multiple pubic rami, right, initial encounter        HPI:  Fang Quiles is a 72 y.o. female with a PMH  has a past medical history of Hypercholesteremia and Osteoporosis.  Presented to the ER for evaluation of right hip pain after having an mechanical fall in her house prior to arrival.  Patient reports landing on her buttock/right hip and states she felt immediate pain.  Patient was unable to get self up off the floor or bear weight on her right lower extremity.  EMS was called to bring patient to hospital for evaluation.  Denies hitting head or losing consciousness.  Initially rated pain 10/10.  Currently rates pain 2/10.  Alleviating factors include pain medicine (fentanyl) given in in route to ED by EMS personnel as well as keeping right lower extremity still .  Aggravating factors include movement palpation over right hip.  denies any numbness/tingling to her right lower extremity.  Denies any other symptoms at this time.ER workup revealed leukocytosis of 13.11 likely reactive to acute fracture seen on imaging.  CT imaging of the pelvis revealed: [Comminuted fracture of the right superior pubic rami.  Fracture of the inferior pubic rami. There is  associated small hematoma the anterior soft tissues of the anterior inferior abdomen wall.  Colonic diverticulosis].  Orthopedic provider on-call consult.  Injury non operative.  Recommended Hospital Medicine to admit for pain control and PT OT eval in a.m..  Patient family at bedside are in agreement with treatment plan.  Patient will be placed in observation status.  PCP: Farida Patel        Overview/Hospital Course:  3/4: cont norco and  morphine prn pain. Pt/ot consulted on case. Awaiting recs. Possible dc tomorrow if pain controlled. Fracture non operable per ortho.   3/5: rehab recommended by PT/OT. CM consulted. Pain controlled.       Interval History: No acute issues overnight. Pain controlled.     Review of Systems   Constitutional:  Negative for fatigue and fever.   HENT:  Negative for sinus pressure.    Eyes:  Negative for visual disturbance.   Respiratory:  Negative for shortness of breath.    Cardiovascular:  Negative for chest pain.   Gastrointestinal:  Negative for nausea and vomiting.   Genitourinary:  Negative for difficulty urinating.   Musculoskeletal:  Positive for arthralgias and gait problem. Negative for back pain.   Skin:  Negative for rash.   Neurological:  Negative for headaches.   Psychiatric/Behavioral:  Negative for confusion.    Objective:     Vital Signs (Most Recent):  Temp: 98.1 °F (36.7 °C) (03/05/23 1108)  Pulse: 79 (03/05/23 1108)  Resp: 20 (03/05/23 1108)  BP: (!) 109/55 (03/05/23 1108)  SpO2: 98 % (03/05/23 1108)   Vital Signs (24h Range):  Temp:  [98.1 °F (36.7 °C)-99.8 °F (37.7 °C)] 98.1 °F (36.7 °C)  Pulse:  [79-94] 79  Resp:  [14-20] 20  SpO2:  [91 %-98 %] 98 %  BP: (107-118)/(52-60) 109/55     Weight: 82.6 kg (182 lb 1.6 oz)  Body mass index is 28.52 kg/m².    Intake/Output Summary (Last 24 hours) at 3/5/2023 1223  Last data filed at 3/5/2023 0519  Gross per 24 hour   Intake 780 ml   Output 1800 ml   Net -1020 ml      Physical Exam  Constitutional:       General: She is not in acute distress.     Appearance: She is well-developed. She is not diaphoretic.   HENT:      Head: Normocephalic and atraumatic.   Eyes:      Pupils: Pupils are equal, round, and reactive to light.   Cardiovascular:      Rate and Rhythm: Normal rate and regular rhythm.      Heart sounds: Normal heart sounds. No murmur heard.    No friction rub. No gallop.   Pulmonary:      Effort: Pulmonary effort is normal. No respiratory distress.       Breath sounds: Normal breath sounds. No stridor. No wheezing or rales.   Abdominal:      General: Bowel sounds are normal. There is no distension.      Palpations: Abdomen is soft. There is no mass.      Tenderness: There is no abdominal tenderness. There is no guarding.   Musculoskeletal:      Comments: ROM limited   Skin:     General: Skin is warm.      Findings: No erythema.   Neurological:      Mental Status: She is alert and oriented to person, place, and time.       Significant Labs: All pertinent labs within the past 24 hours have been reviewed.    Significant Imaging: I have reviewed all pertinent imaging results/findings within the past 24 hours.          Assessment/Plan:      * Closed fracture of multiple pubic rami, right, initial encounter  CT pelvis revealed: Comminuted fracture of the right superior pubic rami.  Fracture of the inferior pubic rami. There is  associated small hematoma the anterior soft tissues of the anterior inferior abdomen wall.  Colonic diverticulosis.    Ortho consulted and stated injury is non operative. NVS grossly intact.  Plan:  -analgesics prn  -fall precautions    PT/OT recommending rehab  SNF consulted   Pain controlled     Gastroesophageal reflux disease without esophagitis  Chronic. Stable. Currently asymptomatic. Home medications include PPI/Antacids as needed.  Plan:  -Continue PPI/Antacids as needed         Hyperlipidemia  Patient is chronically on statin.will not continue for now. Last Lipid Panel:   Lab Results   Component Value Date    CHOL 174 12/03/2021    HDL 48 12/03/2021    LDLCALC 97 12/03/2021    TRIG 146 12/03/2021    CHOLHDL 3.6 12/03/2021     Plan:  -Continue home medication  -low fat/low calorie diet        Osteoporosis  Currently taking raloxifene 60mg daily. Reports compliance with home medications.  Plan:  -continue raloxifene 60mg qd.    Benign essential HTN  Currently normotensive. BP usually well controlled per patient with diet. Not currently on  home medications.  Plan:  -Optimize pain control   -Monitor BP  -Low salt/cardiac diet when not NPO  -IV hydralazine prn for SBP>160 or DBP>90             VTE Risk Mitigation (From admission, onward)         Ordered     enoxaparin injection 40 mg  Daily         03/03/23 2029     IP VTE HIGH RISK PATIENT  Once         03/03/23 2029     Place sequential compression device  Until discontinued         03/03/23 2029                 Discharge Planning   ELIUD:      Code Status: Full Code   Is the patient medically ready for discharge?:     Reason for patient still in hospital (select all that apply): Patient trending condition  Discharge Plan A: Rehab                  Viral Rodriguez MD  Department of Hospital Medicine   [facility]

## 2023-03-06 LAB
CREAT SERPL-MCNC: 0.6 MG/DL (ref 0.5–1.4)
EST. GFR  (NO RACE VARIABLE): >60 ML/MIN/1.73 M^2

## 2023-03-06 PROCEDURE — 97530 THERAPEUTIC ACTIVITIES: CPT | Mod: CQ

## 2023-03-06 PROCEDURE — 99232 PR SUBSEQUENT HOSPITAL CARE,LEVL II: ICD-10-PCS | Mod: ,,, | Performed by: PHYSICIAN ASSISTANT

## 2023-03-06 PROCEDURE — 63600175 PHARM REV CODE 636 W HCPCS: Performed by: NURSE PRACTITIONER

## 2023-03-06 PROCEDURE — 25000003 PHARM REV CODE 250: Performed by: STUDENT IN AN ORGANIZED HEALTH CARE EDUCATION/TRAINING PROGRAM

## 2023-03-06 PROCEDURE — 97110 THERAPEUTIC EXERCISES: CPT

## 2023-03-06 PROCEDURE — 99232 SBSQ HOSP IP/OBS MODERATE 35: CPT | Mod: ,,, | Performed by: PHYSICIAN ASSISTANT

## 2023-03-06 PROCEDURE — 94799 UNLISTED PULMONARY SVC/PX: CPT

## 2023-03-06 PROCEDURE — 36415 COLL VENOUS BLD VENIPUNCTURE: CPT | Performed by: STUDENT IN AN ORGANIZED HEALTH CARE EDUCATION/TRAINING PROGRAM

## 2023-03-06 PROCEDURE — 25000003 PHARM REV CODE 250: Performed by: NURSE PRACTITIONER

## 2023-03-06 PROCEDURE — 82565 ASSAY OF CREATININE: CPT | Performed by: STUDENT IN AN ORGANIZED HEALTH CARE EDUCATION/TRAINING PROGRAM

## 2023-03-06 PROCEDURE — 97116 GAIT TRAINING THERAPY: CPT | Mod: CQ

## 2023-03-06 PROCEDURE — 97530 THERAPEUTIC ACTIVITIES: CPT

## 2023-03-06 PROCEDURE — 11000001 HC ACUTE MED/SURG PRIVATE ROOM

## 2023-03-06 PROCEDURE — 99900035 HC TECH TIME PER 15 MIN (STAT)

## 2023-03-06 RX ORDER — ATORVASTATIN CALCIUM 10 MG/1
10 TABLET, FILM COATED ORAL NIGHTLY
Status: DISCONTINUED | OUTPATIENT
Start: 2023-03-07 | End: 2023-03-07 | Stop reason: HOSPADM

## 2023-03-06 RX ORDER — LIDOCAINE 50 MG/G
1 PATCH TOPICAL
Status: DISCONTINUED | OUTPATIENT
Start: 2023-03-06 | End: 2023-03-07 | Stop reason: HOSPADM

## 2023-03-06 RX ORDER — POLYETHYLENE GLYCOL 3350 17 G/17G
17 POWDER, FOR SOLUTION ORAL DAILY
Status: DISCONTINUED | OUTPATIENT
Start: 2023-03-06 | End: 2023-03-06

## 2023-03-06 RX ORDER — OXYCODONE AND ACETAMINOPHEN 10; 325 MG/1; MG/1
1 TABLET ORAL EVERY 4 HOURS PRN
Status: DISCONTINUED | OUTPATIENT
Start: 2023-03-06 | End: 2023-03-07 | Stop reason: HOSPADM

## 2023-03-06 RX ADMIN — OXYCODONE AND ACETAMINOPHEN 1 TABLET: 10; 325 TABLET ORAL at 10:03

## 2023-03-06 RX ADMIN — RALOXIFENE 60 MG: 60 TABLET ORAL at 09:03

## 2023-03-06 RX ADMIN — POLYETHYLENE GLYCOL 3350 17 G: 17 POWDER, FOR SOLUTION ORAL at 12:03

## 2023-03-06 RX ADMIN — OXYCODONE AND ACETAMINOPHEN 1 TABLET: 10; 325 TABLET ORAL at 09:03

## 2023-03-06 RX ADMIN — ENOXAPARIN SODIUM 40 MG: 40 INJECTION SUBCUTANEOUS at 05:03

## 2023-03-06 RX ADMIN — OXYCODONE AND ACETAMINOPHEN 1 TABLET: 10; 325 TABLET ORAL at 05:03

## 2023-03-06 RX ADMIN — LIDOCAINE 1 PATCH: 50 PATCH CUTANEOUS at 12:03

## 2023-03-06 NOTE — PROGRESS NOTES
[facility]    Patient Name: Fang Quiles  MRN: [id number]  Patient Class: IP- Inpatient   Admission Date: 3/3/2023  Length of Stay: 2 days  Attending Physician: Dimitry Martin MD  Primary Care Provider: Farida Patel MD      Subjective:     Principal Problem:Closed fracture of multiple pubic rami, right, initial encounter        HPI:  aFng Quiles is a 72 y.o. female with a PMH  has a past medical history of Hypercholesteremia and Osteoporosis.  Presented to the ER for evaluation of right hip pain after having an mechanical fall in her house prior to arrival.  Patient reports landing on her buttock/right hip and states she felt immediate pain.  Patient was unable to get self up off the floor or bear weight on her right lower extremity.  EMS was called to bring patient to hospital for evaluation.  Denies hitting head or losing consciousness.  Initially rated pain 10/10.  Currently rates pain 2/10.  Alleviating factors include pain medicine (fentanyl) given in in route to ED by EMS personnel as well as keeping right lower extremity still .  Aggravating factors include movement palpation over right hip.  denies any numbness/tingling to her right lower extremity.  Denies any other symptoms at this time.ER workup revealed leukocytosis of 13.11 likely reactive to acute fracture seen on imaging.  CT imaging of the pelvis revealed: [Comminuted fracture of the right superior pubic rami.  Fracture of the inferior pubic rami. There is  associated small hematoma the anterior soft tissues of the anterior inferior abdomen wall.  Colonic diverticulosis].  Orthopedic provider on-call consult.  Injury non operative.  Recommended Hospital Medicine to admit for pain control and PT OT eval in a.m..  Patient family at bedside are in agreement with treatment plan.  Patient will be placed in observation status.  PCP: Farida Patel        Overview/Hospital Course:  3/4: cont norco and  morphine prn pain. Pt/ot consulted on case. Awaiting recs. Possible dc tomorrow if pain controlled. Fracture non operable per ortho.   3/6: rehab recommended by PT/OT and approved for [facility]. However, pt pain uncontrolled 10/10 with movement of RLE, will adjust PO meds to Percocet 10mg and plan for d/c in AM if tolerated        Review of Systems   Constitutional:  Negative for fatigue and fever.   HENT:  Negative for sinus pressure.    Eyes:  Negative for visual disturbance.   Respiratory:  Negative for shortness of breath.    Cardiovascular:  Negative for chest pain.   Gastrointestinal:  Negative for nausea and vomiting.   Genitourinary:  Negative for difficulty urinating.   Musculoskeletal:  Positive for arthralgias and gait problem. Negative for back pain.   Skin:  Negative for rash.   Neurological:  Negative for headaches.   Psychiatric/Behavioral:  Negative for confusion.    Objective:     Vital Signs (Most Recent):  Temp: 97.9 °F (36.6 °C) (03/06/23 1121)  Pulse: 88 (03/06/23 1121)  Resp: 17 (03/06/23 1121)  BP: 112/60 (03/06/23 1121)  SpO2: 98 % (03/06/23 1121)   Vital Signs (24h Range):  Temp:  [97.9 °F (36.6 °C)-98.7 °F (37.1 °C)] 97.9 °F (36.6 °C)  Pulse:  [73-91] 88  Resp:  [16-18] 17  SpO2:  [94 %-98 %] 98 %  BP: (108-139)/(57-63) 112/60     Weight: 84.1 kg (185 lb 6.5 oz)  Body mass index is 29.04 kg/m².    Intake/Output Summary (Last 24 hours) at 3/6/2023 1355  Last data filed at 3/6/2023 0200  Gross per 24 hour   Intake --   Output 1000 ml   Net -1000 ml        Physical Exam  Constitutional:       General: She is not in acute distress.     Appearance: She is well-developed. She is not diaphoretic.   HENT:      Head: Normocephalic and atraumatic.   Eyes:      Pupils: Pupils are equal, round, and reactive to light.   Cardiovascular:      Rate and Rhythm: Normal rate and regular rhythm.      Heart sounds: Normal heart sounds. No murmur heard.    No friction rub. No gallop.   Pulmonary:      Effort:  Pulmonary effort is normal. No respiratory distress.      Breath sounds: Normal breath sounds. No stridor. No wheezing or rales.   Abdominal:      General: Bowel sounds are normal. There is no distension.      Palpations: Abdomen is soft. There is no mass.      Tenderness: There is no abdominal tenderness. There is no guarding.   Musculoskeletal:      Comments: ROM limited   Skin:     General: Skin is warm.      Findings: No erythema.   Neurological:      Mental Status: She is alert and oriented to person, place, and time.       Significant Labs: All pertinent labs within the past 24 hours have been reviewed.    Significant Imaging: I have reviewed all pertinent imaging results/findings within the past 24 hours.          Assessment/Plan:      * Closed fracture of multiple pubic rami, right, initial encounter  CT pelvis revealed: Comminuted fracture of the right superior pubic rami.  Fracture of the inferior pubic rami. There is  associated small hematoma the anterior soft tissues of the anterior inferior abdomen wall.  Colonic diverticulosis.    Ortho consulted and stated injury is non operative. NVS grossly intact.  Plan:  -analgesics prn  -fall precautions    PT/OT recommending rehab  Pain control      Gastroesophageal reflux disease without esophagitis  Chronic. Stable. Currently asymptomatic. Home medications include PPI/Antacids as needed.  Plan:  -Continue PPI/Antacids as needed         Hyperlipidemia  Patient is chronically on statin.will not continue for now. Last Lipid Panel:   Lab Results   Component Value Date    CHOL 174 12/03/2021    HDL 48 12/03/2021    LDLCALC 97 12/03/2021    TRIG 146 12/03/2021    CHOLHDL 3.6 12/03/2021     Plan:  -Continue home medication  -low fat/low calorie diet        Osteoporosis  Currently taking raloxifene 60mg daily. Reports compliance with home medications.  Plan:  -continue raloxifene 60mg qd.    Benign essential HTN  Currently normotensive. BP usually well controlled per  patient with diet. Not currently on home medications.  Plan:  -Optimize pain control   -Monitor BP  -Low salt/cardiac diet when not NPO  -IV hydralazine prn for SBP>160 or DBP>90           VTE Risk Mitigation (From admission, onward)         Ordered     enoxaparin injection 40 mg  Daily         03/03/23 2029     IP VTE HIGH RISK PATIENT  Once         03/03/23 2029     Place sequential compression device  Until discontinued         03/03/23 2029                Discharge Planning   ELIUD:      Code Status: Full Code   Is the patient medically ready for discharge?:     Reason for patient still in hospital (select all that apply): Patient trending condition, Treatment, PT / OT recommendations and Pending disposition  Discharge Plan A: Rehab                Dimitry Martin MD  Department of Hospital Medicine   [facility] [facility]

## 2023-03-06 NOTE — PLAN OF CARE
03/06/23 0837   Post-Acute Status   Post-Acute Authorization Placement   Post-Acute Placement Status Set-up Complete/Auth obtained   Discharge Plan   Discharge Plan A Rehab     Pt accepted to [facility].

## 2023-03-06 NOTE — PLAN OF CARE
Pt remains free from falls/injuries this shift. Safety precautions maintained. Pt pain managed per prn orders. Pt ambulated to chair with walker x2 assist. All orders active and reviewed. Chart check completed.   Problem: Adult Inpatient Plan of Care  Goal: Plan of Care Review  3/6/2023 0240 by Brii Humphrey RN  Outcome: Ongoing, Progressing  3/6/2023 0239 by Brii Humphrey RN  Outcome: Ongoing, Progressing  Goal: Patient-Specific Goal (Individualized)  3/6/2023 0240 by Brii Humphrey RN  Outcome: Ongoing, Progressing  3/6/2023 0239 by Brii Humphrey RN  Outcome: Ongoing, Progressing  Goal: Absence of Hospital-Acquired Illness or Injury  3/6/2023 0240 by Brii Humphrey RN  Outcome: Ongoing, Progressing  3/6/2023 0239 by Brii Humphrey RN  Outcome: Ongoing, Progressing  Goal: Optimal Comfort and Wellbeing  3/6/2023 0240 by Brii Humphrey RN  Outcome: Ongoing, Progressing  3/6/2023 0239 by Brii Humphrey RN  Outcome: Ongoing, Progressing  Goal: Readiness for Transition of Care  3/6/2023 0240 by Brii Humphrey RN  Outcome: Ongoing, Progressing  3/6/2023 0239 by Brii Humphrey RN  Outcome: Ongoing, Progressing     Problem: Skin Injury Risk Increased  Goal: Skin Health and Integrity  3/6/2023 0240 by Brii Humphrey RN  Outcome: Ongoing, Progressing  3/6/2023 0239 by Brii Humphrey RN  Outcome: Ongoing, Progressing     Problem: Pain Acute  Goal: Acceptable Pain Control and Functional Ability  Outcome: Ongoing, Progressing     Problem: Fall Injury Risk  Goal: Absence of Fall and Fall-Related Injury  Outcome: Ongoing, Progressing     Problem: Fatigue  Goal: Improved Activity Tolerance  Outcome: Ongoing, Progressing

## 2023-03-06 NOTE — PROGRESS NOTES
[facility]  Or[facility]opedics  Progress Note    Patient Name: Fang Quiles  MRN: [id number]  Admission Date: 3/3/2023  Hospital Length of Stay: 2 days  Attending Provider: Dimitry Martin MD  Primary Care Provider: Farida Patel MD    Subjective:     Principal Problem:Closed fracture of multiple pubic rami, right, initial encounter    Principal Orthopedic Problem: Closed fracture of multiple pubic rami, right, initial encounter    Interval History: Fang Quiles is a 72-year-old female admitted for closed fracture multiple right pubic rami following a fall Friday afternoon.  Patient is resting comfortably in bed.  She reports her pain control is satisfactory, but she does have quite a bit of soreness with movement.  She is been unable to ambulate since the injury.    Review of patient's allergies indicates:  No Known Allergies    Current Facility-Administered Medications   Medication    acetaminophen suppository 650 mg    acetaminophen tablet 650 mg    aluminum-magnesium hydroxide-simethicone 200-200-20 mg/5 mL suspension 30 mL    atorvastatin tablet 40 mg    dextrose 10% bolus 125 mL 125 mL    dextrose 10% bolus 250 mL 250 mL    dextrose 40 % gel 15,000 mg    dextrose 40 % gel 30,000 mg    enoxaparin injection 40 mg    glucagon (human recombinant) injection 1 mg    HYDROcodone-acetaminophen 5-325 mg per tablet 1 tablet    melatonin tablet 6 mg    morphine injection 2 mg    naloxone 0.4 mg/mL injection 0.02 mg    ondansetron injection 4 mg    polyethylene glycol packet 17 g    promethazine tablet 25 mg    raloxifene tablet 60 mg    simethicone chewable tablet 80 mg    sodium chloride 0.9% flush 3 mL     Objective:     Vital Signs (Most Recent):  Temp: 98.4 °F (36.9 °C) (03/06/23 0743)  Pulse: 73 (03/06/23 0743)  Resp: 16 (03/06/23 0743)  BP: (!) 108/57 (03/06/23 0743)  SpO2: 97 % (03/06/23 0743)   Vital Signs (24h Range):  Temp:  [98.1 °F (36.7 °C)-98.7 °F (37.1 °C)] 98.4 °F (36.9 °C)  Pulse:  [73-91] 73  Resp:   "[16-20] 16  SpO2:  [93 %-98 %] 97 %  BP: (108-139)/(55-63) 108/57     Weight: 84.1 kg (185 lb 6.5 oz)  Height: 5' 7" (170.2 cm)  Body mass index is 29.04 kg/m².      Intake/Output Summary (Last 24 hours) at 3/6/2023 0823  Last data filed at 3/6/2023 0200  Gross per 24 hour   Intake --   Output 1000 ml   Net -1000 ml       Ortho/SPM Exam  Right lower extremity:  Skin is intact   Patient reports severe TTP around the ASIS  Mild edema   Pain increases with logroll  ROM of the hip not tested secondary to pain  Calf and compartments are soft and compressible  Motor exam normal   Sensation and pulses intact  Cap refill brisk     GEN: Well developed, well nourished female. AAOX3. No acute distress.   Head: Normocephalic, atraumatic.   Eyes: MACKENZIE  Neck: Trachea is midline, no adenopathy  Resp: Breathing unlabored.  Neuro: Motor function normal, Cranial nerves intact  Psych: Mood and affect appropriate.      Significant Labs:   Recent Lab Results         03/06/23  0602        Creatinine 0.6       eGFR >60             All pertinent labs within the past 24 hours have been reviewed.    Significant Imaging: I have reviewed and interpreted all pertinent imaging results/findings.    Assessment/Plan:     Active Diagnoses:    Diagnosis Date Noted POA    PRINCIPAL PROBLEM:  Closed fracture of multiple pubic rami, right, initial encounter [S32.591A] 03/04/2023 Yes    Benign essential HTN [I10] 08/27/2022 Yes    Osteoporosis [M81.0] 09/07/2016 Yes    Gastroesophageal reflux disease without esophagitis [K21.9] 12/31/2015 Yes    Hyperlipidemia [E78.5] 03/10/2015 Yes      Problems Resolved During this Admission:     Assessment:  72-year-old female admitted for closed fracture of multiple pubic rami of the right side     Plan:  Weightbearing as tolerated to the right lower extremity   PT/OT for gait training and ADLs   Pain control as needed   Calcium and vitamin-D   Patient is ready for discharge from orthopedic standpoint, but will " possibly need rehab/SNF placement due to inability to ambulate and take care of herself  We will continue to follow this patient as long she is admitted and will see her as an outpatient after discharge    Alcides Izaguirre PA-C  Orthopedics  [facility]

## 2023-03-06 NOTE — ASSESSMENT & PLAN NOTE
CT pelvis revealed: Comminuted fracture of the right superior pubic rami.  Fracture of the inferior pubic rami. There is  associated small hematoma the anterior soft tissues of the anterior inferior abdomen wall.  Colonic diverticulosis.    Ortho consulted and stated injury is non operative. NVS grossly intact.  Plan:  -analgesics prn  -fall precautions    PT/OT recommending rehab  Pain control

## 2023-03-06 NOTE — PT/OT/SLP PROGRESS
Physical Therapy  Treatment    Fang Quiles   MRN: [id number]   Admitting Diagnosis: Closed fracture of multiple pubic rami, right, initial encounter    PT Received On: 03/06/23  PT Start Time: 1035     PT Stop Time: 1115    PT Total Time (min): 40 min       Billable Minutes:  Gait Training 08 and Therapeutic Activity 32    Treatment Type: Treatment  PT/PTA: PTA     PTA Visit Number: 1       General Precautions: Standard, fall  Orthopedic Precautions: RLE weight bearing as tolerated  Braces: N/A  Respiratory Status: Room air    Spiritual, Cultural Beliefs, Orthodoxy Practices, Values that Affect Care: yes    Subjective:  Communicated with patient's nurse, Ute, and completed Epic chart review prior to session.  Patient agreed to PT session. Reports having gotten pain medication approximately 30 min prior to PT session.     Pain/Comfort  Pain Rating 1: 8/10  Location - Side 1: Right  Location 1: hip  Pain Addressed 1: Pre-medicate for activity, Other (see comments) (ACTIVITY PACING)  Pain Rating Post-Intervention 1: 8/10    Objective:   Patient found with: peripheral IV, PureWick    Supine > sit EOB: Mod A     Forward scoot towards EOB: Mod A     STS from EOB > RW: Max A     X4fwd steps w/ RW Max A (consistent VC for sequencing of task especially to offload through BUE when in R stance phase)    Stand pivot T/F to chair w/ RW: Max A (increased time to complete)    Completed x15 reps AROM TE to BLE: LAQ, AP, LLE hip flex   AAROM x15 reps Hip flex RLE  Intermittent cues given to maintain correct form throughout each repetition    Educated patient on importance of increased tolerance to upright position and direct impact on CV endurance and strength. Patient encouraged to sit up in chair/EOB, for a minimum of 2 consecutive hours, 3x/day. Encouraged patient to perform AROM TE to BLE throughout the day within all available planes of motion. Re enforced importance of utilizing call light to meet needs in room and not  attempt to get up without staff assistance. Patient verbalized understanding of all education and agreed to comply.     AM-PAC 6 CLICK MOBILITY  How much help from another person does this patient currently need?   1 = Unable, Total/Dependent Assistance  2 = A lot, Maximum/Moderate Assistance  3 = A little, Minimum/Contact Guard/Supervision  4 = None, Modified [place]/Independent    Turning over in bed (including adjusting bedclothes, sheets and blankets)?: 2  Sitting down on and standing up from a chair with arms (e.g., wheelchair, bedside commode, etc.): 2  Moving from lying on back to sitting on the side of the bed?: 2  Moving to and from a bed to a chair (including a wheelchair)?: 2  Need to walk in hospital room?: 2  Climbing 3-5 steps with a railing?: 1  Basic Mobility Total Score: 11    AM-PAC Raw Score CMS G-Code Modifier Level of Impairment Assistance   6 % Total / Unable   7 - 9 CM 80 - 100% Maximal Assist   10 - 14 CL 60 - 80% Moderate Assist   15 - 19 CK 40 - 60% Moderate Assist   20 - 22 CJ 20 - 40% Minimal Assist   23 CI 1-20% SBA / CGA   24 CH 0% Independent/ Mod I     Patient left up in chair with call button in reach and family member present.    Assessment:  Fang Quiles is a 72 y.o. female with a medical diagnosis of Closed fracture of multiple pubic rami, right, initial encounter and presents with overall decline in functional mobility. Patient would continue to benefit from skilled PT to address functional limitations listed below in order to return to PLOF/decrease caregiver burden. Patient's greatest limiting factor to progress is uncontrolled pain level. Patient is highly motivated to progress towards recovery and return to PLOF.     Rehab identified problem list/impairments: weakness, impaired endurance, impaired self care skills, impaired functional mobility, gait instability, impaired balance, decreased coordination, decreased lower extremity function, decreased safety  awareness, pain, decreased ROM, impaired coordination, impaired cardiopulmonary response to activity    Rehab potential is good.    Activity tolerance: Fair    Discharge recommendations: rehabilitation facility      Barriers to discharge:      Equipment recommendations: other (see comments) (TBD BY NEXT LEVEL OF CARE)     GOALS:   Multidisciplinary Problems       Physical Therapy Goals          Problem: Physical Therapy    Goal Priority Disciplines Outcome Goal Variances Interventions   Physical Therapy Goal     PT, PT/OT Ongoing, Progressing     Description: LTG:3/18/23  1. PT WILL COMPLETE BED MOBILITY WITH MIN A  2. PT WILL STAND T.F TO CHAIR WITH RW PWB R LE AND CGA  3. PT WILL GT TRAIN X 30' WITH RW AND PWB R LE  4. PT WILL TOLERANCE 20 REPS OF B LE TE X FOR ROM AND STRENGTHENING.                        PLAN:    Patient to be seen 3 x/week to address the above listed problems via gait training, therapeutic activities, therapeutic exercises  Plan of Care expires: 03/18/23  Plan of Care reviewed with: patient         03/06/2023

## 2023-03-06 NOTE — SUBJECTIVE & OBJECTIVE
Review of Systems   Constitutional:  Negative for fatigue and fever.   HENT:  Negative for sinus pressure.    Eyes:  Negative for visual disturbance.   Respiratory:  Negative for shortness of breath.    Cardiovascular:  Negative for chest pain.   Gastrointestinal:  Negative for nausea and vomiting.   Genitourinary:  Negative for difficulty urinating.   Musculoskeletal:  Positive for arthralgias and gait problem. Negative for back pain.   Skin:  Negative for rash.   Neurological:  Negative for headaches.   Psychiatric/Behavioral:  Negative for confusion.    Objective:     Vital Signs (Most Recent):  Temp: 97.9 °F (36.6 °C) (03/06/23 1121)  Pulse: 88 (03/06/23 1121)  Resp: 17 (03/06/23 1121)  BP: 112/60 (03/06/23 1121)  SpO2: 98 % (03/06/23 1121)   Vital Signs (24h Range):  Temp:  [97.9 °F (36.6 °C)-98.7 °F (37.1 °C)] 97.9 °F (36.6 °C)  Pulse:  [73-91] 88  Resp:  [16-18] 17  SpO2:  [94 %-98 %] 98 %  BP: (108-139)/(57-63) 112/60     Weight: 84.1 kg (185 lb 6.5 oz)  Body mass index is 29.04 kg/m².    Intake/Output Summary (Last 24 hours) at 3/6/2023 1355  Last data filed at 3/6/2023 0200  Gross per 24 hour   Intake --   Output 1000 ml   Net -1000 ml        Physical Exam  Constitutional:       General: She is not in acute distress.     Appearance: She is well-developed. She is not diaphoretic.   HENT:      Head: Normocephalic and atraumatic.   Eyes:      Pupils: Pupils are equal, round, and reactive to light.   Cardiovascular:      Rate and Rhythm: Normal rate and regular rhythm.      Heart sounds: Normal heart sounds. No murmur heard.    No friction rub. No gallop.   Pulmonary:      Effort: Pulmonary effort is normal. No respiratory distress.      Breath sounds: Normal breath sounds. No stridor. No wheezing or rales.   Abdominal:      General: Bowel sounds are normal. There is no distension.      Palpations: Abdomen is soft. There is no mass.      Tenderness: There is no abdominal tenderness. There is no guarding.    Musculoskeletal:      Comments: ROM limited   Skin:     General: Skin is warm.      Findings: No erythema.   Neurological:      Mental Status: She is alert and oriented to person, place, and time.       Significant Labs: All pertinent labs within the past 24 hours have been reviewed.    Significant Imaging: I have reviewed all pertinent imaging results/findings within the past 24 hours.

## 2023-03-06 NOTE — PT/OT/SLP PROGRESS
Occupational Therapy      Patient Name:  Fang Quiles   MRN:  [id number]    Patient not seen today secondary to MD conference with patient and family.Will follow-up a later date    3/6/2023  Karley Johnson OT  [id number]

## 2023-03-06 NOTE — PT/OT/SLP PROGRESS
Occupational Therapy  Treatment    Fang Quiles   MRN: [id number]   Admitting Diagnosis: Closed fracture of multiple pubic rami, right, initial encounter    OT Date of Treatment: 03/06/23   OT Start Time: 1045  OT Stop Time: 1125  OT Total Time (min): 40 min    Billable Minutes:  Therapeutic Activity 25 minutes and Therapeutic Exercise 15 minutes    OT/BERNIE: OT          General Precautions: Standard, fall  Orthopedic Precautions: RLE weight bearing as tolerated  Braces: N/A  Respiratory Status: Room air         Subjective:  Communicated with nurse and epic chart review prior to session.    Pain/Comfort  Pain Rating 1: 10/10  Location - Side 1: Right  Location - Orientation 1: generalized    Objective:        Functional Mobility:  Bed Mobility:  Min a scooting EOB with increase time  Mod a with leg lift    Transfers:   Mod a sit<.stand transfer as    Functional Ambulation:   Pt ambulated a few feet with  max a  and vc's for safety and technique.      Balance:   Static Sit: FAIR+: Able to take MINIMAL challenges from all directions  Dynamic Sit: FAIR+: Maintains balance through MINIMAL excursions of active trunk motion supported  Static Stand: 0: Needs MAXIMAL assist to maintain   Dynamic stand: 0: N/A    Therapeutic Activities and Exercises:  Patient educated on role of OT in acute setting and benefits of participation. Educated on techniques to use to increase independence and decrease fall risk with functional transfers. Educated on importance of OOB activity and calling for A to transfer back to bed. Pt  educated on HEP. Pt performed 1 set x 20 reps b ue rom exercise with min resistance (shoulder flex, rotation, chest press, elbow flex/ ext hand/digits flex/ext). Encouraged completion of B UE AROM therex throughout the day to tolerance to increase functional strength and activity tolerance. Patient stated understanding and in agreement with POC.    AM-PAC 6 CLICK ADL   How much help from another person does this  "patient currently need?   1 = Unable, Total/Dependent Assistance  2 = A lot, Maximum/Moderate Assistance  3 = A little, Minimum/Contact Guard/Supervision  4 = None, Modified [place]/Independent    Putting on and taking off regular lower body clothing? : 2  Bathing (including washing, rinsing, drying)?: 2  Toileting, which includes using toilet, bedpan, or urinal? : 2  Putting on and taking off regular upper body clothing?: 2  Taking care of personal grooming such as brushing teeth?: 4  Eating meals?: 4  Daily Activity Total Score: 16     AM-PAC Raw Score CMS "G-Code Modifier Level of Impairment Assistance   6 % Total / Unable   7 - 8 CM 80 - 100% Maximal Assist   9-13 CL 60 - 80% Moderate Assist   14 - 19 CK 40 - 60% Moderate Assist   20 - 22 CJ 20 - 40% Minimal Assist   23 CI 1-20% SBA / CGA   24 CH 0% Independent/ Mod I       Patient left up in chair with all lines intact, call button in reach, chair alarm on, nurse  notified, and daughter present    ASSESSMENT:  Fang Quiles is a 72 y.o. female with a medical diagnosis of Closed fracture of multiple pubic rami, right, initial encounter and presents with debility and generalized weakness.    Rehab identified problem list/impairments:  weakness, impaired functional mobility, decreased safety awareness, impaired endurance, gait instability, impaired balance, impaired self care skills, decreased lower extremity function, decreased upper extremity function    Rehab potential is good.    Activity tolerance: Good    Discharge recommendations: rehabilitation facility   Barriers to discharge: Barriers to Discharge: None    Equipment recommendations: other (see comments) (TBD at next level of care)    GOALS:   Multidisciplinary Problems       Occupational Therapy Goals          Problem: Occupational Therapy    Goal Priority Disciplines Outcome Interventions   Occupational Therapy Goal     OT, PT/OT Ongoing, Progressing    Description: Goals to be met by: " 3/18/23     Patient will increase functional independence with ADLs by performing:    Toileting from bedside commode with Stand-by Assistance for hygiene and clothing management.   Stand pivot transfers with Contact Guard Assistance with RW and RLE PWB.  Upper extremity exercise program x25 reps, with independence.                         Plan:  Patient to be seen 2 x/week to address the above listed problems via self-care/home management, therapeutic activities, therapeutic exercises  Plan of Care expires: 03/18/23  Plan of Care reviewed with: patient         03/06/2023   Skin normal color for race, warm, dry and intact. No evidence of rash.

## 2023-03-06 NOTE — PLAN OF CARE
Plan of care discussed with pt. Pt verbalized understanding. Free from injury. No s/s of distress noted. Vitals stable. IV SL. Meds as ordered. Pain controlled. Lidocaine patch to right hip. Diet tolerated. Q2 hour rounding. No complaints. Will continue to monitor pt. 12 hour chart review.

## 2023-03-07 ENCOUNTER — TELEPHONE (OUTPATIENT)
Dept: ORTHOPEDICS | Facility: CLINIC | Age: 73
End: 2023-03-07
Payer: MEDICARE

## 2023-03-07 VITALS
SYSTOLIC BLOOD PRESSURE: 124 MMHG | WEIGHT: 181.88 LBS | BODY MASS INDEX: 28.55 KG/M2 | HEART RATE: 84 BPM | OXYGEN SATURATION: 92 % | RESPIRATION RATE: 18 BRPM | DIASTOLIC BLOOD PRESSURE: 60 MMHG | HEIGHT: 67 IN | TEMPERATURE: 98 F

## 2023-03-07 PROCEDURE — 99232 SBSQ HOSP IP/OBS MODERATE 35: CPT | Mod: ,,, | Performed by: PHYSICIAN ASSISTANT

## 2023-03-07 PROCEDURE — 25000003 PHARM REV CODE 250: Performed by: NURSE PRACTITIONER

## 2023-03-07 PROCEDURE — 97530 THERAPEUTIC ACTIVITIES: CPT | Mod: CQ

## 2023-03-07 PROCEDURE — 97535 SELF CARE MNGMENT TRAINING: CPT

## 2023-03-07 PROCEDURE — 99232 PR SUBSEQUENT HOSPITAL CARE,LEVL II: ICD-10-PCS | Mod: ,,, | Performed by: PHYSICIAN ASSISTANT

## 2023-03-07 PROCEDURE — 97110 THERAPEUTIC EXERCISES: CPT

## 2023-03-07 PROCEDURE — 25000003 PHARM REV CODE 250: Performed by: STUDENT IN AN ORGANIZED HEALTH CARE EDUCATION/TRAINING PROGRAM

## 2023-03-07 RX ORDER — POLYETHYLENE GLYCOL 3350 17 G/17G
17 POWDER, FOR SOLUTION ORAL DAILY
Refills: 0
Start: 2023-03-08 | End: 2024-01-03

## 2023-03-07 RX ORDER — POLYETHYLENE GLYCOL 3350 17 G/17G
17 POWDER, FOR SOLUTION ORAL DAILY
Status: DISCONTINUED | OUTPATIENT
Start: 2023-03-07 | End: 2023-03-07 | Stop reason: HOSPADM

## 2023-03-07 RX ORDER — AMOXICILLIN 250 MG
1 CAPSULE ORAL 2 TIMES DAILY
Status: DISCONTINUED | OUTPATIENT
Start: 2023-03-07 | End: 2023-03-07 | Stop reason: HOSPADM

## 2023-03-07 RX ORDER — LIDOCAINE 50 MG/G
1 PATCH TOPICAL DAILY
Refills: 0
Start: 2023-03-07 | End: 2024-01-03

## 2023-03-07 RX ORDER — OXYCODONE AND ACETAMINOPHEN 10; 325 MG/1; MG/1
1 TABLET ORAL EVERY 4 HOURS PRN
Qty: 10 TABLET | Refills: 0
Start: 2023-03-07 | End: 2024-01-03

## 2023-03-07 RX ORDER — AMOXICILLIN 250 MG
1 CAPSULE ORAL 2 TIMES DAILY
Start: 2023-03-07 | End: 2024-01-03

## 2023-03-07 RX ADMIN — OXYCODONE AND ACETAMINOPHEN 1 TABLET: 10; 325 TABLET ORAL at 09:03

## 2023-03-07 RX ADMIN — OXYCODONE AND ACETAMINOPHEN 1 TABLET: 10; 325 TABLET ORAL at 05:03

## 2023-03-07 RX ADMIN — LIDOCAINE 1 PATCH: 50 PATCH CUTANEOUS at 01:03

## 2023-03-07 RX ADMIN — SENNOSIDES AND DOCUSATE SODIUM 1 TABLET: 50; 8.6 TABLET ORAL at 05:03

## 2023-03-07 RX ADMIN — OXYCODONE AND ACETAMINOPHEN 1 TABLET: 10; 325 TABLET ORAL at 01:03

## 2023-03-07 RX ADMIN — RALOXIFENE 60 MG: 60 TABLET ORAL at 09:03

## 2023-03-07 NOTE — PLAN OF CARE
[facility] - Med Surg  Discharge Final Note    Primary Care Provider: Farida Patel MD    Expected Discharge Date: 3/7/2023    Final Discharge Note (most recent)       Final Note - 03/07/23 1009          Final Note    Assessment Type Final Discharge Note     Anticipated Discharge Disposition Rehab Facility        Post-Acute Status    Post-Acute Authorization Placement     Post-Acute Placement Status Set-up Complete/Auth obtained                     Important Message from Medicare  Important Message from Medicare regarding Discharge Appeal Rights: Signed/date by patient/caregiver     Date IMM was signed: 03/07/23  Time IMM was signed: 0850    After-discharge care                Destination       [facility]   Service: Inpatient Rehabilitation    [address] [address]   Phone: [phone]

## 2023-03-07 NOTE — PT/OT/SLP PROGRESS
Physical Therapy  Treatment    Fang Quiles   MRN: [id number]   Admitting Diagnosis: Closed fracture of multiple pubic rami, right, initial encounter    PT Received On: 03/07/23  PT Start Time: 0900     PT Stop Time: 0940    PT Total Time (min): 40 min       Billable Minutes:  Therapeutic Activity 40    Treatment Type: Treatment  PT/PTA: PTA     PTA Visit Number: 2       General Precautions: Standard, fall  Orthopedic Precautions: RLE weight bearing as tolerated  Braces: N/A  Respiratory Status: Room air    Spiritual, Cultural Beliefs, Latter day Practices, Values that Affect Care: yes    Subjective:  Communicated with charge nurse, Bernice, and completed Epic chart review prior to session.  Patient agreed to PT session.    Pain/Comfort  Pain Rating 1: 9/10  Location - Side 1: Right  Location 1: hip  Pain Addressed 1: Other (see comments) (ACTIVITY PACING)  Pain Rating Post-Intervention 1: 9/10    Objective:   Patient found with: telemetry, peripheral IV, PureWick    Supine > sit EOB: Mod A     STS from EOB >RW: Mod A (VC for hand placement)    Stand pivot T/F from EOB > BSC w/ RW: Min-Mod A (VC for sequencing of task)    STS from BSC > RW: Min A (VC for hand placement)    Stand pivot T/F to chair from BSC w/ RW: Min - Mod A (VC for sequencing of task)    Completed x15 reps AROM TE to BLE: LAQ, AP, Hip Flex (LLE only)   AAROM Hip Flex RLE x15 reps    Educated patient on importance of increased tolerance to upright position and direct impact on CV endurance and strength. Patient encouraged to sit up in chair, for a minimum of 2 consecutive hours, 3x per day. Encouraged patient to perform AROM TE to BLE throughout the day within all available planes of motion. Re enforced importance of utilizing call light to meet needs in room and not attempt to get up without staff assistance. Patient verbalized understanding of all education given and agreed to comply.       AM-PAC 6 CLICK MOBILITY  How much help from another person  does this patient currently need?   1 = Unable, Total/Dependent Assistance  2 = A lot, Maximum/Moderate Assistance  3 = A little, Minimum/Contact Guard/Supervision  4 = None, Modified [place]/Independent    Turning over in bed (including adjusting bedclothes, sheets and blankets)?: 2  Sitting down on and standing up from a chair with arms (e.g., wheelchair, bedside commode, etc.): 2  Moving from lying on back to sitting on the side of the bed?: 2  Moving to and from a bed to a chair (including a wheelchair)?: 2  Need to walk in hospital room?: 2  Climbing 3-5 steps with a railing?: 1  Basic Mobility Total Score: 11    AM-PAC Raw Score CMS G-Code Modifier Level of Impairment Assistance   6 % Total / Unable   7 - 9 CM 80 - 100% Maximal Assist   10 - 14 CL 60 - 80% Moderate Assist   15 - 19 CK 40 - 60% Moderate Assist   20 - 22 CJ 20 - 40% Minimal Assist   23 CI 1-20% SBA / CGA   24 CH 0% Independent/ Mod I     Patient left up in chair with call button in reach.    Assessment:  Fang Quiles is a 72 y.o. female with a medical diagnosis of Closed fracture of multiple pubic rami, right, initial encounter and presents with overall decline in functional mobility. Patient would continue to benefit from skilled PT to address functional limitations listed below in order to return to PLOF/decrease caregiver burden. Patient was able to improve quality of transfers and ability to weight bear through RLE during standing and transfer training. Continues to appear limited by pain but was able to tolerate more activity and ROM today.     Rehab identified problem list/impairments: weakness, impaired endurance, impaired self care skills, impaired functional mobility, gait instability, impaired balance, decreased coordination, decreased lower extremity function, decreased safety awareness, pain, decreased ROM, impaired cardiopulmonary response to activity, impaired coordination    Rehab potential is good.    Activity  tolerance: Fair    Discharge recommendations: rehabilitation facility      Barriers to discharge:      Equipment recommendations: other (see comments) (TBD BY NEXT LEVEL OF CARE)     GOALS:   Multidisciplinary Problems       Physical Therapy Goals          Problem: Physical Therapy    Goal Priority Disciplines Outcome Goal Variances Interventions   Physical Therapy Goal     PT, PT/OT Ongoing, Progressing     Description: LTG:3/18/23  1. PT WILL COMPLETE BED MOBILITY WITH MIN A  2. PT WILL STAND T.F TO CHAIR WITH RW PWB R LE AND CGA  3. PT WILL GT TRAIN X 30' WITH RW AND PWB R LE  4. PT WILL TOLERANCE 20 REPS OF B LE TE X FOR ROM AND STRENGTHENING.                        PLAN:    Patient to be seen 3 x/week to address the above listed problems via gait training, therapeutic activities, therapeutic exercises  Plan of Care expires: 03/18/23  Plan of Care reviewed with: patient         03/07/2023

## 2023-03-07 NOTE — CONSULTS
[facility] Medicine  Telemedicine Consult Note       Patient has been accepted for transfer to [facility] and will be followed through telemedicine services beginning at 7 AM.        Analy Farmer MD  [facility] Medicine Staff

## 2023-03-07 NOTE — TELEPHONE ENCOUNTER
----- Message from Alcides Izaguirre PA-C sent at 3/7/2023  8:50 AM CST -----  This patient needs to come to [facility] around 03/24/2023

## 2023-03-07 NOTE — PT/OT/SLP PROGRESS
Occupational Therapy   Treatment    Name: Fang Quiles  MRN: [id number]  Admitting Diagnosis:  Closed fracture of multiple pubic rami, right, initial encounter       Recommendations:     Discharge Recommendations: rehabilitation facility  Discharge Equipment Recommendations:  other (see comments) (TBD AT NEXT LEVEL OF CARE)  Barriers to discharge:  None    Assessment:     Fang Quiles is a 72 y.o. female with a medical diagnosis of Closed fracture of multiple pubic rami, right, initial encounter.  She presents with generalized weakness and debility. Performance deficits affecting function are weakness, impaired endurance, impaired self care skills, impaired functional mobility, gait instability, impaired balance, decreased coordination, decreased lower extremity function, decreased safety awareness, pain, decreased ROM, impaired coordination, impaired cardiopulmonary response to activity.     Rehab Prognosis:  Good; patient would benefit from acute skilled OT services to address these deficits and reach maximum level of function.       Plan:     Patient to be seen 2 x/week to address the above listed problems via self-care/home management, therapeutic activities, therapeutic exercises  Plan of Care Expires: 03/18/23  Plan of Care Reviewed with: patient    Subjective     Pain/Comfort:  Pain Rating 1: 0/10 (None when lying supine with HOB elevated. Pain present with movement however did not rate.)  Location - Side 1: Right  Location - Orientation 1: generalized  Location 1: hip  Pain Addressed 1: Pre-medicate for activity, Reposition, Distraction, Cessation of Activity    Objective:     Communicated with: Nurse Ty and epic chart review prior to session.  Patient found supine with peripheral IV, PureWick upon OT entry to room.    General Precautions: Standard, fall    Orthopedic Precautions:RLE weight bearing as tolerated  Braces: N/A  Respiratory Status: Room air     Occupational Performance:     Bed Mobility:     Patient completed Rolling/Turning to Left with  moderate assistance  Patient completed Scooting/Bridging with minimum assistance  Patient completed Supine to Sit with moderate assistance     Functional Mobility/Transfers:  Patient completed Sit <> Stand Transfer with moderate assistance  with  rolling walker   Patient completed Toilet Transfer Stand Pivot technique with minimum assistance and moderate assistance with  rolling walker  Functional Mobility: Pt completed stand pivot and few steps from BSC to chair with RW    [facility] 6 Click ADL: 17    Treatment & Education:  Pt completed B UE therex to improve muscular strength and overall endurance as follows: 1x15 shoulder flex/ext, shoulder abd/add, elbow flex/ext, wrist flex/ext, digit flex/ext. Pt tolerated well, with rest breaks between sets. Pt educated on benefits of actively participating in occupational therapy and POC. Pt educated on fall prevention and use of call button for assistance. Pt encouraged to complete B UE therex throughout day to improve endurance and muscle strength. Pt verbalized understanding of all education.      Patient left up in chair with all lines intact and call button in reach    GOALS:   Multidisciplinary Problems       Occupational Therapy Goals          Problem: Occupational Therapy    Goal Priority Disciplines Outcome Interventions   Occupational Therapy Goal     OT, PT/OT Ongoing, Progressing    Description: Goals to be met by: 3/18/23     Patient will increase functional independence with ADLs by performing:    Toileting from bedside commode with Stand-by Assistance for hygiene and clothing management.   Stand pivot transfers with Contact Guard Assistance with RW and RLE PWB.  Upper extremity exercise program x25 reps, with independence.                         Time Tracking:     OT Date of Treatment: 03/07/23  OT Start Time: 0906  OT Stop Time: 0944  OT Total Time (min): 38 min    Billable Minutes:Self Care/Home Management 23  Mins  Therapeutic Exercise 15 Mins    OT/BERNIE: OT          3/7/2023

## 2023-03-07 NOTE — PLAN OF CARE
03/07/23 0854   Post-Acute Status   Post-Acute Authorization Placement   Post-Acute Placement Status Set-up Complete/Auth obtained   Discharge Plan   Discharge Plan A Rehab     Pending dc orders.

## 2023-03-07 NOTE — PLAN OF CARE
[facility]     Department of Hospital Medicine     [address] [place] [address]     [phone] after hours  [phone] fax       REHAB ORDERS    03/07/2023    Admit to REHAB                                                 Diagnoses:  Active Hospital Problems    Diagnosis  POA    *Closed fracture of multiple pubic rami, right, initial encounter [S32.702V]  Yes    Benign essential HTN [I10]  Yes    Osteoporosis [M81.0]  Yes     Formatting of this note might be different from the original.  Dr Fuentes GYN did DEXA in May 2014. On Evista daily.  DEXA due May 2016.  Formatting of this note might be different from the original.  Overview:   Dr Fuentes GYN did DEXA in May 2014. On Evista daily.  DEXA due May 2016.     Last Assessment & Plan:   Formatting of this note might be different from the original.  Chronic, stable. Currently taking raloxifene 60mg daily. Complications of osteoporosis is bone fracture.  Recommendations under 66 yo are Calcium 100-1200 mg daily through diet or supplementation and vitamin D 400 IU daily.  Over 66 yo calcium 1500 mg through diet or supplementation and Vitamin D 800 IU daily.  Avoid smoking and heavy alcohol use.  Exercise daily and stay active.  Fall precautions.      Gastroesophageal reflux disease without esophagitis [K21.9]  Yes     Last Assessment & Plan:   Formatting of this note might be different from the original.  Chronic, stable. Currently diet controlled. Continue to follow up with Dr. Patel.      Hyperlipidemia [E78.5]  Yes     Last Assessment & Plan:   Formatting of this note might be different from the original.  Chronic stable.  Continue taking Crestor 10mg, daily. Continue to follow with Farida Patel MD. Complications of Hyperlipidemia are-Coronary Artery Disease, Stroke.  Recommendations low fat, low salt. low cholesterol diet, increase exercise to 30-60 minutes.        Resolved Hospital Problems   No  resolved problems to display.       Allergies:Review of patient's allergies indicates:  No Known Allergies    Vitals:  Every shift (Skilled Nursing patients)    Diet: cardiac   Supplement:  1 can every three times a day with meals                         Type:  House         Acitivities: Per PT     LABS:  Per facility protocol    Nursing Precautions:      - Fall precautions per nursing home protocol   - Decubitus precautions:        -  for positioning   - Pressure reducing foam mattress   - Turn patient every two hours. Use wedge pillows to anchor patient   - Aspiration precautions        CONSULTS:      Physical Therapy to evaluate and treat     Occupational Therapy to evaluate and treat     Nutrition to evaluate and recommend diet     Speech Therapy  to evaluate and treat     Psychiatry to evaluate and follow patients for delirium    MISCELLANEOUS CARE:     Routine Skin for Bedridden Patients:  Apply moisture barrier cream to all    skin folds and wet areas in perineal area daily and after baths and                           all bowel movements.            Medications: Discontinue all previous medication orders, if any. See new list below.    Current Discharge Medication List        START taking these medications    Details   LIDOcaine (LIDODERM) 5 % Place 1 patch onto the skin once daily. Remove & Discard patch within 12 hours or as directed by MD  Refills: 0      oxyCODONE-acetaminophen (PERCOCET)  mg per tablet Take 1 tablet by mouth every 4 (four) hours as needed for Pain.  Qty: 10 tablet, Refills: 0    Comments: Quantity prescribed more than 7 day supply? No      polyethylene glycol (GLYCOLAX) 17 gram PwPk Take 17 g by mouth once daily.  Refills: 0      senna-docusate 8.6-50 mg (PERICOLACE) 8.6-50 mg per tablet Take 1 tablet by mouth 2 (two) times daily.           CONTINUE these medications which have NOT CHANGED    Details   raloxifene (EVISTA) 60 mg tablet Take 60 mg by mouth once daily.       rosuvastatin (CRESTOR) 10 MG tablet Take 10 mg by mouth every evening.           STOP taking these medications       lisinopriL 10 MG tablet Comments:   Reason for Stopping:                       _________________________________  Analy Farmer MD  03/07/2023

## 2023-03-07 NOTE — PLAN OF CARE
Discharge education given. Pt verbalized an understanding. IV removed, catheter intact. Pt tp be discharged with personal belongings. [facility] transportation present to bring pt to facility.

## 2023-03-07 NOTE — PLAN OF CARE
Pt sitting in chair upon initial assessment. Pt transferred to bed with help of walker and assistance x2. Pain increased upon movement. Pain well-controlled with PO medication. Hourly rounding performed. Bed in lowest position, side rails raised x2. Pt resting well in bed.     Problem: Adult Inpatient Plan of Care  Goal: Plan of Care Review  Outcome: Ongoing, Progressing  Goal: Patient-Specific Goal (Individualized)  Outcome: Ongoing, Progressing  Goal: Absence of Hospital-Acquired Illness or Injury  Outcome: Ongoing, Progressing  Goal: Optimal Comfort and Wellbeing  Outcome: Ongoing, Progressing  Goal: Readiness for Transition of Care  Outcome: Ongoing, Progressing     Problem: Skin Injury Risk Increased  Goal: Skin Health and Integrity  Outcome: Ongoing, Progressing     Problem: Pain Acute  Goal: Acceptable Pain Control and Functional Ability  Outcome: Ongoing, Progressing     Problem: Fall Injury Risk  Goal: Absence of Fall and Fall-Related Injury  Outcome: Ongoing, Progressing     Problem: Fatigue  Goal: Improved Activity Tolerance  Outcome: Ongoing, Progressing

## 2023-03-07 NOTE — TELEPHONE ENCOUNTER
Phoned patient to schedule her ER followup appointment. Left a message for patient to call back. Appointment scheduled prior to hospital discharge.

## 2023-03-07 NOTE — PROGRESS NOTES
[facility]  Orthopedics  Progress Note    Patient Name: Fang Quiles  MRN: [id number]  Admission Date: 3/3/2023  Hospital Length of Stay: 3 days  Attending Provider: Analy Farmer MD  Primary Care Provider: Farida Patel MD    Subjective:     Principal Problem:Closed fracture of multiple pubic rami, right, initial encounter    Principal Orthopedic Problem: Closed fracture of multiple pubic rami, right, initial encounter    Interval History: Fang Quiles is a 72-year-old female admitted for closed fracture multiple right pubic rami following a fall Friday afternoon.  Patient is resting comfortably in bed.  Patient reports she does not have any pain or resting, but is very difficult and painful.    Review of patient's allergies indicates:  No Known Allergies    Current Facility-Administered Medications   Medication    acetaminophen suppository 650 mg    acetaminophen tablet 650 mg    aluminum-magnesium hydroxide-simethicone 200-200-20 mg/5 mL suspension 30 mL    atorvastatin tablet 10 mg    dextrose 10% bolus 125 mL 125 mL    dextrose 10% bolus 250 mL 250 mL    dextrose 40 % gel 15,000 mg    dextrose 40 % gel 30,000 mg    enoxaparin injection 40 mg    glucagon (human recombinant) injection 1 mg    LIDOcaine 5 % patch 1 patch    melatonin tablet 6 mg    naloxone 0.4 mg/mL injection 0.02 mg    ondansetron injection 4 mg    oxyCODONE-acetaminophen  mg per tablet 1 tablet    polyethylene glycol packet 17 g    promethazine tablet 25 mg    raloxifene tablet 60 mg    senna-docusate 8.6-50 mg per tablet 1 tablet    simethicone chewable tablet 80 mg    sodium chloride 0.9% flush 3 mL     Objective:     Vital Signs (Most Recent):  Temp: 98.7 °F (37.1 °C) (03/07/23 0742)  Pulse: 79 (03/07/23 0742)  Resp: 17 (03/07/23 0742)  BP: (!) 122/56 (03/07/23 0742)  SpO2: 95 % (03/07/23 0742)   Vital Signs (24h Range):  Temp:  [97.9 °F (36.6 °C)-99.4 °F (37.4 °C)] 98.7 °F (37.1 °C)  Pulse:  [63-88] 79  Resp:  [16-20]  "17  SpO2:  [94 %-98 %] 95 %  BP: (109-130)/(56-64) 122/56     Weight: 82.5 kg (181 lb 14.1 oz)  Height: 5' 7" (170.2 cm)  Body mass index is 28.49 kg/m².      Intake/Output Summary (Last 24 hours) at 3/7/2023 0830  Last data filed at 3/7/2023 0515  Gross per 24 hour   Intake 360 ml   Output 2100 ml   Net -1740 ml       Ortho/SPM Exam  Right lower extremity:  Skin is intact   Patient reports severe TTP around the ASIS   Mild edema  No pain with logroll  ROM of the hip is not tested secondary to pain  Calf and compartments are soft and compressible  Motor exam normal   Sensation and pulses intact  Cap refill brisk     GEN: Well developed, well nourished female. AAOX3. No acute distress.   Head: Normocephalic, atraumatic.   Eyes: MACKENZIE  Neck: Trachea is midline, no adenopathy  Resp: Breathing unlabored.  Neuro: Motor function normal, Cranial nerves intact  Psych: Mood and affect appropriate.      Significant Labs:   Recent Lab Results       None          All pertinent labs within the past 24 hours have been reviewed.    Significant Imaging: I have reviewed and interpreted all pertinent imaging results/findings.    Assessment/Plan:     Active Diagnoses:    Diagnosis Date Noted POA    PRINCIPAL PROBLEM:  Closed fracture of multiple pubic rami, right, initial encounter [S32.591A] 03/04/2023 Yes    Benign essential HTN [I10] 08/27/2022 Yes    Osteoporosis [M81.0] 09/07/2016 Yes    Gastroesophageal reflux disease without esophagitis [K21.9] 12/31/2015 Yes    Hyperlipidemia [E78.5] 03/10/2015 Yes      Problems Resolved During this Admission:     Assessment:  72-year-old female admitted for closed fracture of multiple right pubic rami     Plan:  Weightbearing as tolerated to the right lower extremity  PT/OT for gait training and ADLs   Pain control as needed  Calcium and vitamin-D   Patient is likely going to need rehab/SNF placement in his ready for discharge once this has been arranged   We will continue to follow her as an " outpatient in our clinic    Alcides Izaguirre PA-C  Orthopedics  [facility]

## 2023-03-07 NOTE — PLAN OF CARE
03/07/23 0853   Medicare Message   Important Message from Medicare regarding Discharge Appeal Rights Signed/date by patient/caregiver   Date IMM was signed 03/07/23   Time IMM was signed 0849

## 2023-03-08 NOTE — DISCHARGE SUMMARY
[facility] Medicine  Discharge Summary      Patient Name: Fang Quiles  MRN: [id number]  Patient Class: IP- Inpatient  Admission Date: 3/3/2023  Hospital Length of Stay: 3 days  Discharge Date and Time: 3/7/2023  2:00 PM  Attending Physician: Jennifer att. providers found   Discharging Provider: Analy Farmer MD  Primary Care Provider: Farida Patel MD      HPI:   Fang Quiles is a 72 y.o. female with a PMH  has a past medical history of Hypercholesteremia and Osteoporosis.  Presented to the ER for evaluation of right hip pain after having an mechanical fall in her house prior to arrival.  Patient reports landing on her buttock/right hip and states she felt immediate pain.  Patient was unable to get self up off the floor or bear weight on her right lower extremity.  EMS was called to bring patient to hospital for evaluation.  Denies hitting head or losing consciousness.  Initially rated pain 10/10.  Currently rates pain 2/10.  Alleviating factors include pain medicine (fentanyl) given in in route to ED by EMS personnel as well as keeping right lower extremity still .  Aggravating factors include movement palpation over right hip.  denies any numbness/tingling to her right lower extremity.  Denies any other symptoms at this time.ER workup revealed leukocytosis of 13.11 likely reactive to acute fracture seen on imaging.  CT imaging of the pelvis revealed: [Comminuted fracture of the right superior pubic rami.  Fracture of the inferior pubic rami. There is  associated small hematoma the anterior soft tissues of the anterior inferior abdomen wall.  Colonic diverticulosis].  Orthopedic provider on-call consult.  Injury non operative.  Recommended Hospital Medicine to admit for pain control and PT OT eval in a.m..  Patient family at bedside are in agreement with treatment plan.  Patient will be placed in observation status.  PCP: Farida Patel        * No surgery found *      Hospital Course:   3/4:  cont norco and morphine prn pain. Pt/ot consulted on case. Awaiting recs. Possible dc tomorrow if pain controlled. Fracture non operable per ortho.   3/6: rehab recommended by PT/OT and approved for  Rehab. However, pt pain uncontrolled 10/10 with movement of RLE, will adjust PO meds to Percocet 10mg. Pain improved on PO pain meds. Was able to with with OT/PT. Pt DC to SNF in stable condition.        Goals of Care Treatment Preferences:  Code Status: Full Code      Consults:   Consults (From admission, onward)        Status Ordering Provider     Inpatient virtual consult to Hospital Medicine  Once        Provider:  Analy Farmer MD    Completed LEELEE MILLER     Inpatient consult to Social Work  Once        Provider:  (Not yet assigned)    Completed LE, YANDEL     Inpatient consult to Social Work  Once        Provider:  (Not yet assigned)    Completed LE, YANDEL          No new Assessment & Plan notes have been filed under this hospital service since the last note was generated.  Service: Hospital Medicine    Final Active Diagnoses:    Diagnosis Date Noted POA    PRINCIPAL PROBLEM:  Closed fracture of multiple pubic rami, right, initial encounter [S32.591A] 03/04/2023 Yes    Benign essential HTN [I10] 08/27/2022 Yes    Osteoporosis [M81.0] 09/07/2016 Yes    Gastroesophageal reflux disease without esophagitis [K21.9] 12/31/2015 Yes    Hyperlipidemia [E78.5] 03/10/2015 Yes      Problems Resolved During this Admission:       Discharged Condition: stable    Disposition: Skilled Nursing Facility    Follow Up:   Contact information for after-discharge care     Destination     [facility] .    Service: Inpatient Rehabilitation  Contact information:  [address] [facility] [address]  [phone]                           Patient Instructions:      Ambulatory referral/consult to Orthopedics   Standing Status: Future   Referral Priority: Routine Referral Type: Consultation    Requested Specialty: Orthopedic Surgery   Number of Visits Requested: 1     Diet Cardiac     Notify your health care provider if you experience any of the following:  temperature >100.4     Notify your health care provider if you experience any of the following:  persistent nausea and vomiting or diarrhea     Notify your health care provider if you experience any of the following:  severe uncontrolled pain     Notify your health care provider if you experience any of the following:  redness, tenderness, or signs of infection (pain, swelling, redness, odor or green/yellow discharge around incision site)     Notify your health care provider if you experience any of the following:  difficulty breathing or increased cough     Notify your health care provider if you experience any of the following:  severe persistent headache     Notify your health care provider if you experience any of the following:  worsening rash     Notify your health care provider if you experience any of the following:  persistent dizziness, light-headedness, or visual disturbances     Notify your health care provider if you experience any of the following:  increased confusion or weakness     Send follow up & questions to   Order Comments: Patient's primary care physician: Farida Patel MD     Activity as tolerated       Significant Diagnostic Studies: Labs:   BMP:   Recent Labs   Lab 03/06/23  0602   CREATININE 0.6    and CMP   Recent Labs   Lab 03/06/23  0602   CREATININE 0.6       Pending Diagnostic Studies:     None         Medications:  Reconciled Home Medications:      Medication List      START taking these medications    LIDOcaine 5 %  Commonly known as: LIDODERM  Place 1 patch onto the skin once daily. Remove & Discard patch within 12 hours or as directed by MD     oxyCODONE-acetaminophen  mg per tablet  Commonly known as: PERCOCET  Take 1 tablet by mouth every 4 (four) hours as needed for Pain.     polyethylene glycol 17 gram  Pwpk  Commonly known as: GLYCOLAX  Take 17 g by mouth once daily.     senna-docusate 8.6-50 mg 8.6-50 mg per tablet  Commonly known as: PERICOLACE  Take 1 tablet by mouth 2 (two) times daily.        CONTINUE taking these medications    raloxifene 60 mg tablet  Commonly known as: EVISTA  Take 60 mg by mouth once daily.     rosuvastatin 10 MG tablet  Commonly known as: CRESTOR  Take 10 mg by mouth every evening.        STOP taking these medications    lisinopriL 10 MG tablet            Indwelling Lines/Drains at time of discharge:   Lines/Drains/Airways     None                 Time spent on the discharge of patient: 39 minutes         The attending portion of this evaluation, treatment, and documentation was performed per Analy Farmer MD via Telemedicine AudioVisual using the secure docBeat software platform with 2 way audio/video. The provider was located off-site and the patient is located in the hospital. The aforementioned video software was utilized to document the relevant history and physical exam    Analy Farmer MD  Department of Hospital Medicine  [facility]

## 2023-03-20 DIAGNOSIS — R10.2 PAIN IN PELVIS: Primary | ICD-10-CM

## 2023-03-22 ENCOUNTER — TELEPHONE (OUTPATIENT)
Dept: ORTHOPEDICS | Facility: CLINIC | Age: 73
End: 2023-03-22
Payer: MEDICARE

## 2023-03-22 NOTE — TELEPHONE ENCOUNTER
Spoke with patient and informed her she is okay to arrive around 8:30am. Patient verbalized understanding.

## 2023-03-22 NOTE — TELEPHONE ENCOUNTER
----- Message from Sanford Livingston sent at 3/22/2023  9:40 AM CDT -----  Contact: Self  Pt is asking for an return call in reference to apt scheduled on 03/24, pt is needing an later apt time,please call back at .[phone] Thx CJ

## 2023-03-24 ENCOUNTER — HOSPITAL ENCOUNTER (OUTPATIENT)
Dept: RADIOLOGY | Facility: HOSPITAL | Age: 73
Discharge: HOME | End: 2023-03-24
Attending: PHYSICIAN ASSISTANT
Payer: MEDICARE

## 2023-03-24 ENCOUNTER — OFFICE VISIT (OUTPATIENT)
Dept: ORTHOPEDICS | Facility: CLINIC | Age: 73
End: 2023-03-24
Payer: MEDICARE

## 2023-03-24 VITALS
BODY MASS INDEX: 28.41 KG/M2 | HEART RATE: 62 BPM | HEIGHT: 67 IN | SYSTOLIC BLOOD PRESSURE: 120 MMHG | WEIGHT: 181 LBS | DIASTOLIC BLOOD PRESSURE: 68 MMHG

## 2023-03-24 DIAGNOSIS — R10.2 PAIN IN PELVIS: Primary | ICD-10-CM

## 2023-03-24 DIAGNOSIS — S32.591D CLOSED FRACTURE OF MULTIPLE PUBIC RAMI, RIGHT, WITH ROUTINE HEALING, SUBSEQUENT ENCOUNTER: Primary | ICD-10-CM

## 2023-03-24 DIAGNOSIS — R10.2 PAIN IN PELVIS: ICD-10-CM

## 2023-03-24 PROCEDURE — 99214 PR OFFICE/OUTPT VISIT, EST, LEVL IV, 30-39 MIN: ICD-10-PCS | Mod: S$PBB,,, | Performed by: PHYSICIAN ASSISTANT

## 2023-03-24 PROCEDURE — 99214 OFFICE O/P EST MOD 30 MIN: CPT | Mod: S$PBB,,, | Performed by: PHYSICIAN ASSISTANT

## 2023-03-24 PROCEDURE — 72170 XR PELVIS ROUTINE AP: ICD-10-PCS | Mod: 26,,, | Performed by: RADIOLOGY

## 2023-03-24 PROCEDURE — 72170 X-RAY EXAM OF PELVIS: CPT | Mod: TC

## 2023-03-24 PROCEDURE — 99213 OFFICE O/P EST LOW 20 MIN: CPT | Mod: PBBFAC | Performed by: PHYSICIAN ASSISTANT

## 2023-03-24 PROCEDURE — 99999 PR PBB SHADOW E&M-EST. PATIENT-LVL III: ICD-10-PCS | Mod: PBBFAC,,, | Performed by: PHYSICIAN ASSISTANT

## 2023-03-24 PROCEDURE — 99999 PR PBB SHADOW E&M-EST. PATIENT-LVL III: CPT | Mod: PBBFAC,,, | Performed by: PHYSICIAN ASSISTANT

## 2023-03-24 PROCEDURE — 72170 X-RAY EXAM OF PELVIS: CPT | Mod: 26,,, | Performed by: RADIOLOGY

## 2023-03-24 RX ORDER — PANTOPRAZOLE SODIUM 40 MG/1
40 TABLET, DELAYED RELEASE ORAL
COMMUNITY
Start: 2023-03-16

## 2023-03-24 RX ORDER — CELECOXIB 100 MG/1
100 CAPSULE ORAL 2 TIMES DAILY
COMMUNITY
Start: 2023-03-16 | End: 2024-01-03

## 2023-03-30 NOTE — PROGRESS NOTES
"Subjective:      Patient ID: Fang Quiles is a 72 y.o. female.    Chief Complaint: Pain and Injury of the Pelvis      HPI: Fang Quiles is a 22-year-old female in clinic today for follow-up of closed fracture of multiple right pubic rami following a fall 3 weeks ago.  Patient was seen for this problem while she was in the hospital.  She has been discharged and is improving.  She has been weight-bearing as tolerated without any increase in pain.  She denies numbness or tingling in the extremities.    Past Medical History:   Diagnosis Date    Hypercholesteremia     Osteoporosis        Current Outpatient Medications:     celecoxib (CELEBREX) 100 MG capsule, Take 100 mg by mouth 2 (two) times daily., Disp: , Rfl:     LIDOcaine (LIDODERM) 5 %, Place 1 patch onto the skin once daily. Remove & Discard patch within 12 hours or as directed by MD, Disp: , Rfl: 0    oxyCODONE-acetaminophen (PERCOCET)  mg per tablet, Take 1 tablet by mouth every 4 (four) hours as needed for Pain., Disp: 10 tablet, Rfl: 0    pantoprazole (PROTONIX) 40 MG tablet, Take 40 mg by mouth., Disp: , Rfl:     raloxifene (EVISTA) 60 mg tablet, Take 60 mg by mouth once daily., Disp: , Rfl:     rosuvastatin (CRESTOR) 10 MG tablet, Take 10 mg by mouth every evening., Disp: , Rfl:     polyethylene glycol (GLYCOLAX) 17 gram PwPk, Take 17 g by mouth once daily. (Patient not taking: Reported on 3/24/2023), Disp: , Rfl: 0    senna-docusate 8.6-50 mg (PERICOLACE) 8.6-50 mg per tablet, Take 1 tablet by mouth 2 (two) times daily. (Patient not taking: Reported on 3/24/2023), Disp: , Rfl:   Review of patient's allergies indicates:  No Known Allergies    /68   Pulse 62   Ht 5' 7" (1.702 m)   Wt 82.1 kg (181 lb)   BMI 28.35 kg/m²     ROS      Objective:    Ortho Exam   Right lower extremity:  Skin is intact   No pain with internal/external rotation of the hip   Hip ROM normal   Calf and compartments are soft and compressible  Motor exam normal "   Sensation and pulses intact    GEN: Well developed, well nourished female. AAOX3. No acute distress.   Head: Normocephalic, atraumatic.   Eyes: MACKENZIE  Neck: Trachea is midline, no adenopathy  Resp: Breathing unlabored.  Neuro: Motor function normal, Cranial nerves intact  Psych: Mood and affect appropriate.       Assessment:     Imaging:  X-ray AP pelvis obtained today shows previously noted right superior pubic ramus fracture with increased displacement and a previously noted right inferior pubic ramus fracture in similar alignment.        1. Closed fracture of multiple pubic rami, right, with routine healing, subsequent encounter          Plan:       Reviewed the radiographs with the patient.  Explained that since her pain is improving and she has no symptoms of more serious complications that we will continue to let her do what she has been doing, which is weight-bearing as tolerated with a walker.  Patient should continue to increase activities as tolerated.  Cautioned the patient on fall avoidance.  Return to clinic in 1 month for repeat radiographs and further evaluation.       Follow up in about 1 month (around 4/24/2023).          Patient note was created using MModal Dictation.  Any errors in syntax or even information may not have been identified and edited on initial review prior to signing this note.

## 2023-04-21 DIAGNOSIS — R10.2 PAIN IN PELVIS: Primary | ICD-10-CM

## 2023-04-24 ENCOUNTER — HOSPITAL ENCOUNTER (OUTPATIENT)
Dept: RADIOLOGY | Facility: HOSPITAL | Age: 73
Discharge: HOME | End: 2023-04-24
Attending: PHYSICIAN ASSISTANT
Payer: MEDICARE

## 2023-04-24 ENCOUNTER — OFFICE VISIT (OUTPATIENT)
Dept: ORTHOPEDICS | Facility: CLINIC | Age: 73
End: 2023-04-24
Payer: MEDICARE

## 2023-04-24 VITALS
SYSTOLIC BLOOD PRESSURE: 133 MMHG | HEIGHT: 67 IN | WEIGHT: 181 LBS | BODY MASS INDEX: 28.41 KG/M2 | DIASTOLIC BLOOD PRESSURE: 80 MMHG | HEART RATE: 74 BPM

## 2023-04-24 DIAGNOSIS — S32.10XD CLOSED FRACTURE OF SACRUM WITH ROUTINE HEALING, UNSPECIFIED PORTION OF SACRUM, SUBSEQUENT ENCOUNTER: ICD-10-CM

## 2023-04-24 DIAGNOSIS — S32.591D CLOSED FRACTURE OF MULTIPLE PUBIC RAMI, RIGHT, WITH ROUTINE HEALING, SUBSEQUENT ENCOUNTER: Primary | ICD-10-CM

## 2023-04-24 DIAGNOSIS — R10.2 PAIN IN PELVIS: ICD-10-CM

## 2023-04-24 DIAGNOSIS — R10.2 PAIN IN PELVIS: Primary | ICD-10-CM

## 2023-04-24 PROCEDURE — 72170 X-RAY EXAM OF PELVIS: CPT | Mod: TC

## 2023-04-24 PROCEDURE — 99999 PR PBB SHADOW E&M-EST. PATIENT-LVL III: CPT | Mod: PBBFAC,,, | Performed by: PHYSICIAN ASSISTANT

## 2023-04-24 PROCEDURE — 99214 PR OFFICE/OUTPT VISIT, EST, LEVL IV, 30-39 MIN: ICD-10-PCS | Mod: S$PBB,,, | Performed by: PHYSICIAN ASSISTANT

## 2023-04-24 PROCEDURE — 72170 X-RAY EXAM OF PELVIS: CPT | Mod: 26,,, | Performed by: RADIOLOGY

## 2023-04-24 PROCEDURE — 99213 OFFICE O/P EST LOW 20 MIN: CPT | Mod: PBBFAC | Performed by: PHYSICIAN ASSISTANT

## 2023-04-24 PROCEDURE — 99999 PR PBB SHADOW E&M-EST. PATIENT-LVL III: ICD-10-PCS | Mod: PBBFAC,,, | Performed by: PHYSICIAN ASSISTANT

## 2023-04-24 PROCEDURE — 99214 OFFICE O/P EST MOD 30 MIN: CPT | Mod: S$PBB,,, | Performed by: PHYSICIAN ASSISTANT

## 2023-04-24 PROCEDURE — 72170 XR PELVIS ROUTINE AP: ICD-10-PCS | Mod: 26,,, | Performed by: RADIOLOGY

## 2023-04-24 RX ORDER — PREDNISONE 20 MG/1
TABLET ORAL
COMMUNITY
Start: 2023-04-22 | End: 2024-01-03

## 2023-04-24 RX ORDER — PROMETHAZINE HYDROCHLORIDE AND DEXTROMETHORPHAN HYDROBROMIDE 6.25; 15 MG/5ML; MG/5ML
SYRUP ORAL
COMMUNITY
Start: 2023-04-22 | End: 2024-01-03

## 2023-04-24 RX ORDER — AMOXICILLIN AND CLAVULANATE POTASSIUM 875; 125 MG/1; MG/1
TABLET, FILM COATED ORAL
COMMUNITY
Start: 2023-04-22 | End: 2024-01-03 | Stop reason: ALTCHOICE

## 2023-04-24 NOTE — PROGRESS NOTES
Subjective:      Patient ID: Fang Quiles is a 72 y.o. female.    Chief Complaint: Pain and Injury of the Pelvis      HPI: Fang Quiles is a 72-year-old female in clinic today for follow-up of closed fracture of multiple right pubic rami following a fall on 03/03/2023.  Patient was last seen in this clinic on 03/24/2023.  Since then she is been going to physical therapy where she reports that her hip and groin pain is greatly improving, but she does have new onset of right-sided low back pain.  She denies numbness or tingling in the extremities.  She denies any radiation of her symptoms.    Past Medical History:   Diagnosis Date    Hypercholesteremia     Osteoporosis        Current Outpatient Medications:     amoxicillin-clavulanate 875-125mg (AUGMENTIN) 875-125 mg per tablet, , Disp: , Rfl:     celecoxib (CELEBREX) 100 MG capsule, Take 100 mg by mouth 2 (two) times daily., Disp: , Rfl:     LIDOcaine (LIDODERM) 5 %, Place 1 patch onto the skin once daily. Remove & Discard patch within 12 hours or as directed by MD, Disp: , Rfl: 0    pantoprazole (PROTONIX) 40 MG tablet, Take 40 mg by mouth., Disp: , Rfl:     predniSONE (DELTASONE) 20 MG tablet, , Disp: , Rfl:     promethazine-dextromethorphan (PROMETHAZINE-DM) 6.25-15 mg/5 mL Syrp, , Disp: , Rfl:     raloxifene (EVISTA) 60 mg tablet, Take 60 mg by mouth once daily., Disp: , Rfl:     rosuvastatin (CRESTOR) 10 MG tablet, Take 10 mg by mouth every evening., Disp: , Rfl:     oxyCODONE-acetaminophen (PERCOCET)  mg per tablet, Take 1 tablet by mouth every 4 (four) hours as needed for Pain. (Patient not taking: Reported on 4/24/2023), Disp: 10 tablet, Rfl: 0    polyethylene glycol (GLYCOLAX) 17 gram PwPk, Take 17 g by mouth once daily. (Patient not taking: Reported on 3/24/2023), Disp: , Rfl: 0    senna-docusate 8.6-50 mg (PERICOLACE) 8.6-50 mg per tablet, Take 1 tablet by mouth 2 (two) times daily. (Patient not taking: Reported on 3/24/2023), Disp: , Rfl:  "  Review of patient's allergies indicates:  No Known Allergies    /80   Pulse 74   Ht 5' 7" (1.702 m)   Wt 82.1 kg (181 lb)   BMI 28.35 kg/m²     ROS      Objective:    Ortho Exam   Right lower extremity:  Skin is intact   No pain with internal/external rotation of the hip   Hip ROM is normal   Calf and compartments are soft and compressible   Motor exam normal   Sensation and pulses intact    GEN: Well developed, well nourished female. AAOX3. No acute distress.   Head: Normocephalic, atraumatic.   Eyes: MACKENZIE  Neck: Trachea is midline, no adenopathy  Resp: Breathing unlabored.  Neuro: Motor function normal, Cranial nerves intact  Psych: Mood and affect appropriate.       Assessment:     Imaging:  X-ray AP pelvis obtained today shows previously noted fractures of the right superior and inferior pubic rami in similar alignment with interval callus formation.  There is concern for right sacral fracture.  Moderate osteoarthritis of the bilateral hips.  No detrimental changes.        1. Closed fracture of multiple pubic rami, right, with routine healing, subsequent encounter    2. Closed fracture of sacrum with routine healing, unspecified portion of sacrum, subsequent encounter          Plan:       Reviewed the radiographs with the patient.  Recommended she continue to increase all activities as tolerated.  She should continue going to physical therapy for ROM and strengthening exercises.  We discussed the possibility for kyphoplasty if the sacral fracture continues to cause her discomfort.  Cautioned patient on fall avoidance.  Patient will return to this clinic in 6 weeks, if necessary.       Follow up in about 6 weeks (around 6/5/2023).          Patient note was created using MModal Dictation.  Any errors in syntax or even information may not have been identified and edited on initial review prior to signing this note.    "

## 2023-12-18 DIAGNOSIS — M25.531 RIGHT WRIST PAIN: Primary | ICD-10-CM

## 2024-01-03 ENCOUNTER — OFFICE VISIT (OUTPATIENT)
Dept: ORTHOPEDICS | Facility: CLINIC | Age: 74
End: 2024-01-03
Payer: MEDICARE

## 2024-01-03 ENCOUNTER — HOSPITAL ENCOUNTER (OUTPATIENT)
Dept: RADIOLOGY | Facility: HOSPITAL | Age: 74
Discharge: HOME | End: 2024-01-03
Attending: ORTHOPAEDIC SURGERY
Payer: MEDICARE

## 2024-01-03 VITALS — BODY MASS INDEX: 28.35 KG/M2 | HEIGHT: 67 IN

## 2024-01-03 DIAGNOSIS — M25.531 RIGHT WRIST PAIN: ICD-10-CM

## 2024-01-03 DIAGNOSIS — M65.4 RADIAL STYLOID TENOSYNOVITIS (DE QUERVAIN): Primary | ICD-10-CM

## 2024-01-03 PROCEDURE — 73110 X-RAY EXAM OF WRIST: CPT | Mod: 26,RT,, | Performed by: STUDENT IN AN ORGANIZED HEALTH CARE EDUCATION/TRAINING PROGRAM

## 2024-01-03 PROCEDURE — 99213 OFFICE O/P EST LOW 20 MIN: CPT | Mod: S$PBB,25,, | Performed by: ORTHOPAEDIC SURGERY

## 2024-01-03 PROCEDURE — 99999 PR PBB SHADOW E&M-EST. PATIENT-LVL II: CPT | Mod: PBBFAC,,, | Performed by: ORTHOPAEDIC SURGERY

## 2024-01-03 PROCEDURE — 73110 X-RAY EXAM OF WRIST: CPT | Mod: TC,RT

## 2024-01-03 PROCEDURE — 20550 NJX 1 TENDON SHEATH/LIGAMENT: CPT | Mod: PBBFAC,RT | Performed by: ORTHOPAEDIC SURGERY

## 2024-01-03 PROCEDURE — 99999PBSHW PR PBB SHADOW TECHNICAL ONLY FILED TO HB: Mod: PBBFAC,,,

## 2024-01-03 PROCEDURE — 99212 OFFICE O/P EST SF 10 MIN: CPT | Mod: PBBFAC | Performed by: ORTHOPAEDIC SURGERY

## 2024-01-03 RX ORDER — CALCIUM CARBONATE 200(500)MG
500 TABLET,CHEWABLE ORAL
COMMUNITY

## 2024-01-03 RX ORDER — TRIAMCINOLONE ACETONIDE 40 MG/ML
40 INJECTION, SUSPENSION INTRA-ARTICULAR; INTRAMUSCULAR
Status: DISCONTINUED | OUTPATIENT
Start: 2024-01-03 | End: 2024-01-03 | Stop reason: HOSPADM

## 2024-01-03 RX ORDER — LANOLIN ALCOHOL/MO/W.PET/CERES
1 CREAM (GRAM) TOPICAL 2 TIMES DAILY
COMMUNITY

## 2024-01-03 RX ADMIN — TRIAMCINOLONE ACETONIDE 40 MG: 200 INJECTION, SUSPENSION INTRA-ARTICULAR; INTRAMUSCULAR at 09:01

## 2024-01-03 NOTE — PROCEDURES
Tendon [facility]    Date/Time: 1/3/2024 9:15 AM    Performed by: Gerald Kennedy MD  Authorized by: Gerald Kennedy MD    Consent Done?:  Yes (Verbal)  Indications:  Pain  Local anesthesia used?: Yes    Local anesthetic:  Lidocaine 2% without epinephrine  Anesthetic total (ml):  0.5    Location:  Wrist  Site:  R first doral compartment  Ultrasonic guidance for needle placement?: No    Needle size:  25 G  Approach:  Radial  Medications:  40 mg triamcinolone acetonide 40 mg/mL

## 2024-01-03 NOTE — PROGRESS NOTES
Subjective:     Patient ID: Fang Quiles is a 73 y.o. female.    Chief Complaint: Pain of the Right Wrist and Pain of the Right Hand      HPI:  The patient is a 73-year-old female with right de Quervain tendonitis.  She has not tried injection or splinting yet.  She also has some element of basal joint arthritis but this is much less of an issue    Past Medical History:   Diagnosis Date    Hypercholesteremia     Osteoporosis      Past Surgical History:   Procedure Laterality Date     SECTION      FRACTURE SURGERY       No family history on file.  Social History     Socioeconomic History    Marital status:    Tobacco Use    Smoking status: Never    Smokeless tobacco: Never   Substance and Sexual Activity    Alcohol use: Never    Drug use: Never     Medication List with Changes/Refills   Current Medications    CALCIUM CARBONATE (TUMS) 200 MG CALCIUM (500 MG) CHEWABLE TABLET    Take 500 mg by mouth.    CALCIUM CITRATE-VITAMIN D3 315-200 MG (CITRACAL+D) 315 MG-5 MCG (200 UNIT) PER TABLET    Take 1 tablet by mouth 2 (two) times daily.    PANTOPRAZOLE (PROTONIX) 40 MG TABLET    Take 40 mg by mouth.    RALOXIFENE (EVISTA) 60 MG TABLET    Take 60 mg by mouth once daily.    ROSUVASTATIN (CRESTOR) 10 MG TABLET    Take 10 mg by mouth every evening.   Discontinued Medications    AMOXICILLIN-CLAVULANATE 875-125MG (AUGMENTIN) 875-125 MG PER TABLET        CELECOXIB (CELEBREX) 100 MG CAPSULE    Take 100 mg by mouth 2 (two) times daily.    LIDOCAINE (LIDODERM) 5 %    Place 1 patch onto the skin once daily. Remove & Discard patch within 12 hours or as directed by MD    OXYCODONE-ACETAMINOPHEN (PERCOCET)  MG PER TABLET    Take 1 tablet by mouth every 4 (four) hours as needed for Pain.    POLYETHYLENE GLYCOL (GLYCOLAX) 17 GRAM PWPK    Take 17 g by mouth once daily.    PREDNISONE (DELTASONE) 20 MG TABLET        PROMETHAZINE-DEXTROMETHORPHAN (PROMETHAZINE-DM) 6.25-15 MG/5 ML SYRP        SENNA-DOCUSATE 8.6-50 MG  (PERICOLACE) 8.6-50 MG PER TABLET    Take 1 tablet by mouth 2 (two) times daily.     Review of patient's allergies indicates:  No Known Allergies  Review of Systems   Constitutional: Negative for malaise/fatigue.   HENT:  Negative for hearing loss.    Eyes:  Negative for double vision and visual disturbance.   Cardiovascular:  Positive for irregular heartbeat. Negative for chest pain.   Respiratory:  Negative for shortness of breath.    Endocrine: Negative for cold intolerance.   Hematologic/Lymphatic: Does not bruise/bleed easily.   Skin:  Negative for poor wound healing and suspicious lesions.   Musculoskeletal:  Positive for arthritis, joint pain and joint swelling. Negative for gout.   Gastrointestinal:  Positive for heartburn. Negative for nausea and vomiting.   Genitourinary:  Negative for dysuria.   Neurological:  Negative for numbness, paresthesias and sensory change.   Psychiatric/Behavioral:  Negative for depression, memory loss and substance abuse. The patient is not nervous/anxious.    Allergic/Immunologic: Negative for persistent infections.       Objective:   Body mass index is 28.35 kg/m².  There were no vitals filed for this visit.             General    Constitutional: She is oriented to person, place, and time. She appears well-developed and well-nourished. No distress.   HENT:   Head: Normocephalic.   Eyes: EOM are normal.   Pulmonary/Chest: Effort normal.   Neurological: She is oriented to person, place, and time.   Psychiatric: She has a normal mood and affect.             Right Hand/Wrist Exam     Inspection   Scars: Wrist - absent Hand -  absent  Effusion: Wrist - absent Hand -  absent    Pain   Wrist - The patient exhibits pain of the extensory musculature.    Tests   Finkelstein's test: positive      Other     Neuorologic Exam    Median Distribution: normal  Ulnar Distribution: normal  Radial Distribution: normal    Comments:  The patient has tenderness 1st dorsal extensor tendon compartment  right wrist.  With a positive Finkelstein test          Vascular Exam       Capillary Refill  Right Hand: normal capillary refill          Relevant imaging results reviewed and interpreted by me, discussed with the patient and / or family today radiographs right wrist showed some arthritis in the basal joint  Assessment:     Encounter Diagnosis   Name Primary?    Radial styloid tenosynovitis (de quervain) Yes    Right wrist    Plan:       The patient injected right 1st dorsal extensor tendon compartment with 0.5 cc of Kenalog and 0.5 cc of 2% plain lidocaine under sterile technique.  She was given a thumb spica splint.  She will wait at least 3 months between injections.              Disclaimer: This note was prepared using a voice recognition system and is likely to have sound alike errors within the text.